# Patient Record
Sex: FEMALE | Race: WHITE | Employment: OTHER | URBAN - METROPOLITAN AREA
[De-identification: names, ages, dates, MRNs, and addresses within clinical notes are randomized per-mention and may not be internally consistent; named-entity substitution may affect disease eponyms.]

---

## 2018-01-01 ENCOUNTER — HOSPITAL ENCOUNTER (OUTPATIENT)
Dept: OTHER | Age: 68
Discharge: HOME | End: 2018-08-08
Attending: INTERNAL MEDICINE | Admitting: INTERNAL MEDICINE

## 2018-01-01 ENCOUNTER — HOSPITAL ENCOUNTER (OUTPATIENT)
Dept: GENERAL RADIOLOGY | Age: 68
Discharge: HOME | End: 2018-08-23
Attending: INTERNAL MEDICINE | Admitting: INTERNAL MEDICINE

## 2018-01-01 ENCOUNTER — HOSPITAL ENCOUNTER (OUTPATIENT)
Age: 68
Setting detail: SPECIMEN
Discharge: HOME | End: 2018-10-22
Payer: COMMERCIAL

## 2018-01-01 ENCOUNTER — APPOINTMENT (OUTPATIENT)
Dept: GENERAL RADIOLOGY | Age: 68
End: 2018-01-01
Payer: COMMERCIAL

## 2018-01-01 ENCOUNTER — OFFICE VISIT (OUTPATIENT)
Dept: BARIATRICS/WEIGHT MGMT | Age: 68
End: 2018-01-01
Payer: COMMERCIAL

## 2018-01-01 ENCOUNTER — HOSPITAL ENCOUNTER (EMERGENCY)
Age: 68
Discharge: HOME | End: 2018-10-02
Payer: COMMERCIAL

## 2018-01-01 ENCOUNTER — APPOINTMENT (OUTPATIENT)
Dept: CT IMAGING | Age: 68
End: 2018-01-01
Payer: COMMERCIAL

## 2018-01-01 ENCOUNTER — HOSPITAL ENCOUNTER (OUTPATIENT)
Age: 68
Setting detail: SPECIMEN
Discharge: HOME | End: 2018-12-03
Payer: COMMERCIAL

## 2018-01-01 ENCOUNTER — HOSPITAL ENCOUNTER (OUTPATIENT)
Age: 68
Setting detail: SPECIMEN
Discharge: HOME | End: 2018-11-12
Payer: COMMERCIAL

## 2018-01-01 VITALS
HEART RATE: 88 BPM | WEIGHT: 202 LBS | DIASTOLIC BLOOD PRESSURE: 90 MMHG | RESPIRATION RATE: 18 BRPM | HEIGHT: 67 IN | BODY MASS INDEX: 31.71 KG/M2 | SYSTOLIC BLOOD PRESSURE: 114 MMHG

## 2018-01-01 VITALS
SYSTOLIC BLOOD PRESSURE: 131 MMHG | DIASTOLIC BLOOD PRESSURE: 75 MMHG | OXYGEN SATURATION: 94 % | WEIGHT: 218 LBS | HEART RATE: 70 BPM | RESPIRATION RATE: 16 BRPM | TEMPERATURE: 97.9 F | HEIGHT: 66 IN | BODY MASS INDEX: 35.03 KG/M2

## 2018-01-01 DIAGNOSIS — M89.9 LYTIC LESION OF BONE ON X-RAY: ICD-10-CM

## 2018-01-01 DIAGNOSIS — C41.1: ICD-10-CM

## 2018-01-01 DIAGNOSIS — K76.9 HEPATIC LESION: Primary | ICD-10-CM

## 2018-01-01 DIAGNOSIS — S05.12XA ECCHYMOSIS OF LEFT EYE: ICD-10-CM
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
## 2018-01-01 DIAGNOSIS — S42.202A CLOSED FRACTURE OF PROXIMAL END OF LEFT HUMERUS, UNSPECIFIED FRACTURE MORPHOLOGY, INITIAL ENCOUNTER: Primary | ICD-10-CM

## 2018-01-01 LAB
ALBUMIN SERPL-MCNC: 3.6 GM/DL (ref 3.4–5)
ALBUMIN SERPL-MCNC: 3.6 GM/DL (ref 3.4–5)
ALBUMIN SERPL-MCNC: 3.8 GM/DL (ref 3.4–5)
ALP BLD-CCNC: 248 IU/L (ref 40–129)
ALP BLD-CCNC: 259 IU/L (ref 40–128)
ALP BLD-CCNC: 284 IU/L (ref 40–129)
ALT SERPL-CCNC: 18 U/L (ref 10–40)
ALT SERPL-CCNC: 21 U/L (ref 10–40)
ALT SERPL-CCNC: 36 U/L (ref 10–40)
ANION GAP SERPL CALCULATED.3IONS-SCNC: 12 MMOL/L (ref 4–16)
AST SERPL-CCNC: 40 IU/L (ref 15–37)
AST SERPL-CCNC: 42 IU/L (ref 15–37)
AST SERPL-CCNC: 74 IU/L (ref 15–37)
BILIRUB SERPL-MCNC: 0.4 MG/DL (ref 0–1)
BILIRUB SERPL-MCNC: 0.5 MG/DL (ref 0–1)
BILIRUB SERPL-MCNC: 0.7 MG/DL (ref 0–1)
BUN BLDV-MCNC: 7 MG/DL (ref 6–23)
BUN BLDV-MCNC: 8 MG/DL (ref 6–23)
BUN BLDV-MCNC: 9 MG/DL (ref 6–23)
CA 125: 18
CA 19-9: 27
CA 27.29: 8.4 U/ML
CALCIUM SERPL-MCNC: 9.2 MG/DL (ref 8.3–10.6)
CALCIUM SERPL-MCNC: 9.2 MG/DL (ref 8.3–10.6)
CALCIUM SERPL-MCNC: 9.6 MG/DL (ref 8.3–10.6)
CEA: 2.1 NG/ML
CHLORIDE BLD-SCNC: 100 MMOL/L (ref 99–110)
CHLORIDE BLD-SCNC: 101 MMOL/L (ref 99–110)
CHLORIDE BLD-SCNC: 101 MMOL/L (ref 99–110)
CO2: 29 MMOL/L (ref 21–32)
CO2: 30 MMOL/L (ref 21–32)
CO2: 30 MMOL/L (ref 21–32)
CREAT SERPL-MCNC: 0.6 MG/DL (ref 0.6–1.1)
CREAT SERPL-MCNC: 0.7 MG/DL (ref 0.6–1.1)
CREAT SERPL-MCNC: 0.7 MG/DL (ref 0.6–1.1)
GFR AFRICAN AMERICAN: >60 ML/MIN/1.73M2
GFR NON-AFRICAN AMERICAN: >60 ML/MIN/1.73M2
GLUCOSE BLD-MCNC: 101 MG/DL (ref 70–99)
GLUCOSE BLD-MCNC: 128 MG/DL (ref 70–99)
GLUCOSE BLD-MCNC: 143 MG/DL (ref 70–99)
LACTATE DEHYDROGENASE: 217 IU/L (ref 120–246)
MS ALPHA-FETOPROTEIN: 5
POTASSIUM SERPL-SCNC: 4.3 MMOL/L (ref 3.5–5.1)
POTASSIUM SERPL-SCNC: 4.5 MMOL/L (ref 3.5–5.1)
POTASSIUM SERPL-SCNC: 4.8 MMOL/L (ref 3.5–5.1)
SODIUM BLD-SCNC: 141 MMOL/L (ref 135–145)
SODIUM BLD-SCNC: 143 MMOL/L (ref 135–145)
SODIUM BLD-SCNC: 143 MMOL/L (ref 135–145)
TOTAL PROTEIN: 6.8 GM/DL (ref 6.4–8.2)
TOTAL PROTEIN: 7 GM/DL (ref 6.4–8.2)
TOTAL PROTEIN: 7.5 GM/DL (ref 6.4–8.2)

## 2018-01-01 PROCEDURE — 73070 X-RAY EXAM OF ELBOW: CPT

## 2018-01-01 PROCEDURE — 73030 X-RAY EXAM OF SHOULDER: CPT

## 2018-01-01 PROCEDURE — 99284 EMERGENCY DEPT VISIT MOD MDM: CPT

## 2018-01-01 PROCEDURE — 80053 COMPREHEN METABOLIC PANEL: CPT

## 2018-01-01 PROCEDURE — 70486 CT MAXILLOFACIAL W/O DYE: CPT

## 2018-01-01 PROCEDURE — 70450 CT HEAD/BRAIN W/O DYE: CPT

## 2018-01-01 PROCEDURE — 6370000000 HC RX 637 (ALT 250 FOR IP): Performed by: NURSE PRACTITIONER

## 2018-01-01 PROCEDURE — 99204 OFFICE O/P NEW MOD 45 MIN: CPT | Performed by: SURGERY

## 2018-01-01 RX ORDER — HYDROCODONE BITARTRATE AND ACETAMINOPHEN 5; 325 MG/1; MG/1
1 TABLET ORAL ONCE
Status: COMPLETED | OUTPATIENT
Start: 2018-01-01 | End: 2018-01-01

## 2018-01-01 RX ORDER — DIAZEPAM 5 MG/1
1 TABLET ORAL 2 TIMES DAILY PRN
Refills: 5 | Status: ON HOLD | COMMUNITY
Start: 2018-01-01 | End: 2019-01-01 | Stop reason: SDUPTHER

## 2018-01-01 RX ORDER — HYDROCODONE BITARTRATE AND ACETAMINOPHEN 5; 325 MG/1; MG/1
1 TABLET ORAL EVERY 6 HOURS PRN
Qty: 10 TABLET | Refills: 0 | Status: SHIPPED | OUTPATIENT
Start: 2018-01-01 | End: 2018-01-01

## 2018-01-01 RX ORDER — ATENOLOL 100 MG/1
100 TABLET ORAL
Status: ON HOLD | COMMUNITY
End: 2019-01-01 | Stop reason: HOSPADM

## 2018-01-01 RX ORDER — CHLORAL HYDRATE 500 MG
3000 CAPSULE ORAL 3 TIMES DAILY
COMMUNITY

## 2018-01-01 RX ADMIN — HYDROCODONE BITARTRATE AND ACETAMINOPHEN 1 TABLET: 5; 325 TABLET ORAL at 20:13

## 2018-01-01 ASSESSMENT — PAIN SCALES - GENERAL
PAINLEVEL_OUTOF10: 8
PAINLEVEL_OUTOF10: 8

## 2018-01-01 ASSESSMENT — PAIN DESCRIPTION - ORIENTATION: ORIENTATION: LEFT

## 2018-01-01 ASSESSMENT — ENCOUNTER SYMPTOMS
ALLERGIC/IMMUNOLOGIC NEGATIVE: 1
GASTROINTESTINAL NEGATIVE: 1
EYES NEGATIVE: 1
RESPIRATORY NEGATIVE: 1

## 2018-01-01 ASSESSMENT — PAIN DESCRIPTION - PAIN TYPE: TYPE: ACUTE PAIN

## 2018-01-01 ASSESSMENT — PAIN DESCRIPTION - LOCATION: LOCATION: ARM

## 2018-10-03 NOTE — ED NOTES
Discharge instructions reviewed with patient. PT verbalizes understanding. All questions answered. Follow up instructions given. PT denies any further needs at this time.       Brody Ruiz [place]  03/87/60 1776

## 2018-10-03 NOTE — ED PROVIDER NOTES
pm    COMPARISON:  None    HISTORY:  ORDERING SYSTEM PROVIDED HISTORY: fall  TECHNOLOGIST PROVIDED HISTORY:  Reason for exam:->fall  Ordering Physician Provided Reason for Exam: fall  Acuity: Acute  Type of Exam: Initial    FINDINGS:  Left shoulder: There is a comminuted proximal left humeral head and neck  fracture with involvement of the surgical and anatomic necks as well as  greater tuberosity.  The major humeral head fragment remains located in  relation to the bony glenoid. There is mild AC and glenohumeral degenerative change.  No additional  fracture is appreciated. Left elbow: There is no evidence of acute fracture or dislocation.  There is  no joint effusion.  There is a small triceps insertional enthesophyte.  The  bones are mildly demineralized.                Preliminary result by Billie Jose MD (10/02/18 19:31:32)                Impression:    1. Comminuted acute left proximal humeral head and neck fracture. 2. No acute osseous abnormality of the left elbow.                    XR SHOULDER LEFT (MIN 2 VIEWS) (Preliminary result)   Result time 10/02/18 19:33:40   Preliminary result by Billie Jose MD (10/02/18 19:33:40)                Impression:    1. Comminuted acute left proximal humeral head and neck fracture. 2. No acute osseous abnormality of the left elbow. Narrative:    EXAMINATION:  3 XRAY VIEWS OF THE LEFT SHOULDER; 2 XRAY VIEWS OF THE LEFT ELBOW    10/2/2018 6:44 pm    COMPARISON:  None    HISTORY:  ORDERING SYSTEM PROVIDED HISTORY: fall  TECHNOLOGIST PROVIDED HISTORY:  Reason for exam:->fall  Ordering Physician Provided Reason for Exam: fall  Acuity: Acute  Type of Exam: Initial    FINDINGS:  Left shoulder: There is a comminuted proximal left humeral head and neck  fracture with involvement of the surgical and anatomic necks as well as  greater tuberosity.  The major humeral head fragment remains located in  relation to the bony glenoid.     There is mild AC and called? ->No  Ordering Physician Provided Reason for Exam: fall at 3am  Acuity: Acute  Type of Exam: Initial  Mechanism of Injury: fall at 3am  Relevant Medical/Surgical History: none    FINDINGS:  CT HEAD:    BRAIN/VENTRICLES: There is no acute intracranial hemorrhage, mass effect or  midline shift.  No abnormal extra-axial fluid collection.  The gray-white  differentiation is maintained without evidence of an acute infarct.  There is  no evidence of hydrocephalus. SOFT TISSUES/SKULL: There is soft tissue swelling and induration overlying  the left supraorbital frontal bone.  Otherwise, no acute abnormality of the  visualized skull or soft tissues. CT FACIAL BONES:    FACIAL BONES:  The maxilla, pterygoid plates and zygomatic arches are intact. The mandible is intact.  The mandibular condyles are normally situated.  The  nasal bones and maxillary nasal processes are intact. Lucyann Shark is an expansile  lytic lesion involving the body of the left mandible measuring 3.2 cm in  maximum transverse dimension and 2.4 cm in maximal craniocaudal dimension. ORBITS: There is left preorbital soft tissue swelling.  No post orbital  cellulitic change.  The globes appear intact.  The extraocular muscles, optic  nerve sheath complexes and lacrimal glands appear unremarkable.  No  retrobulbar hematoma or mass is seen.  The orbital walls and rims are intact. SINUSES/MASTOIDS:  The paranasal sinuses and mastoid air cells are well  aerated.  No acute fracture is seen. SOFT TISSUES:  No appreciable facial soft tissue swelling is seen.                    CT HEAD WO CONTRAST (Final result)   Result time 10/02/18 19:16:21   Final result by Laina Bell MD (10/02/18 19:16:21)                Impression:    1. No CT evidence for acute osseous abnormality.   2. Left preseptal cellulitic change and soft tissue swelling overlying the  left supraorbital frontal bone, but no post orbital cellulitic change or  underlying calvarial expansile  lytic lesion involving the body of the left mandible measuring 3.2 cm in  maximum transverse dimension and 2.4 cm in maximal craniocaudal dimension. ORBITS: There is left preorbital soft tissue swelling.  No post orbital  cellulitic change.  The globes appear intact.  The extraocular muscles, optic  nerve sheath complexes and lacrimal glands appear unremarkable.  No  retrobulbar hematoma or mass is seen.  The orbital walls and rims are intact. SINUSES/MASTOIDS:  The paranasal sinuses and mastoid air cells are well  aerated.  No acute fracture is seen. SOFT TISSUES:  No appreciable facial soft tissue swelling is seen. ED COURSE & MEDICAL DECISION MAKING       Vital signs and nursing notes reviewed during ED course. I have independently evaluated this patient . Supervising physican present in the Emergency Department, available for consultation, throughout entirety of  patient care. All pertinent Lab data and radiographic results reviewed with patient at bedside. The patient and/or the family were informed of the treatment plan, and time was allotted to answer questions. Disposition and plan discussed at bedside with patient and/or the family today. Patient does have a proximal humerus fracture that involves the head and neck. Patient instructed to call orthopedics tomorrow for follow-up appointment. Patient was placed in a sling and swath and provided pain medication. Patient's other imaging was negative for acute findings. When I went to palpation about the lytic lesion that was found patient was very aware and reports that is currently being worked up by her primary care provider. Signs and symptoms that would necessitate return to the emergency department were discussed the patient and/or family and patient and/or family agrees to return to the emergency department if the symptoms worsen or new symptoms develop.       Differential Diagnosis: Cardiac Arrhythmia, Stroke,

## 2019-01-01 ENCOUNTER — HOSPITAL ENCOUNTER (OUTPATIENT)
Age: 69
Setting detail: SPECIMEN
Discharge: HOME | End: 2019-04-08
Payer: MEDICARE

## 2019-01-01 ENCOUNTER — HOSPITAL ENCOUNTER (OUTPATIENT)
Age: 69
Setting detail: SPECIMEN
Discharge: HOME | End: 2019-03-18
Payer: MEDICARE

## 2019-01-01 ENCOUNTER — APPOINTMENT (OUTPATIENT)
Dept: CT IMAGING | Age: 69
DRG: 682 | End: 2019-01-01
Payer: COMMERCIAL

## 2019-01-01 ENCOUNTER — APPOINTMENT (OUTPATIENT)
Dept: MRI IMAGING | Age: 69
DRG: 682 | End: 2019-01-01
Payer: COMMERCIAL

## 2019-01-01 ENCOUNTER — APPOINTMENT (OUTPATIENT)
Dept: GENERAL RADIOLOGY | Age: 69
DRG: 871 | End: 2019-01-01
Payer: COMMERCIAL

## 2019-01-01 ENCOUNTER — HOSPITAL ENCOUNTER (INPATIENT)
Age: 69
LOS: 2 days | Discharge: HOSPICE | DRG: 871 | End: 2019-04-23
Attending: EMERGENCY MEDICINE | Admitting: HOSPITALIST
Payer: COMMERCIAL

## 2019-01-01 ENCOUNTER — APPOINTMENT (OUTPATIENT)
Dept: GENERAL RADIOLOGY | Age: 69
DRG: 682 | End: 2019-01-01
Payer: COMMERCIAL

## 2019-01-01 ENCOUNTER — HOSPITAL ENCOUNTER (OUTPATIENT)
Age: 69
Setting detail: SPECIMEN
Discharge: HOME | End: 2019-04-20
Payer: MEDICARE

## 2019-01-01 ENCOUNTER — HOSPITAL ENCOUNTER (OUTPATIENT)
Dept: CT IMAGING | Age: 69
Discharge: HOME | End: 2019-01-09
Payer: COMMERCIAL

## 2019-01-01 ENCOUNTER — HOSPITAL ENCOUNTER (INPATIENT)
Age: 69
LOS: 9 days | Discharge: NURSING, REHAB OR LONG-TERM CARE | DRG: 682 | End: 2019-03-10
Attending: EMERGENCY MEDICINE | Admitting: HOSPITALIST
Payer: COMMERCIAL

## 2019-01-01 ENCOUNTER — HOSPITAL ENCOUNTER (OUTPATIENT)
Age: 69
Setting detail: SPECIMEN
Discharge: HOME | End: 2019-04-01
Payer: MEDICARE

## 2019-01-01 ENCOUNTER — HOSPITAL ENCOUNTER (OUTPATIENT)
Age: 69
Setting detail: SPECIMEN
Discharge: HOME | End: 2019-04-15
Payer: COMMERCIAL

## 2019-01-01 ENCOUNTER — APPOINTMENT (OUTPATIENT)
Dept: CT IMAGING | Age: 69
DRG: 871 | End: 2019-01-01
Payer: COMMERCIAL

## 2019-01-01 ENCOUNTER — HOSPITAL ENCOUNTER (OUTPATIENT)
Age: 69
Setting detail: SPECIMEN
Discharge: HOME | End: 2019-04-21
Payer: MEDICARE

## 2019-01-01 ENCOUNTER — HOSPITAL ENCOUNTER (OUTPATIENT)
Age: 69
Setting detail: SPECIMEN
Discharge: HOME | End: 2019-03-25
Payer: MEDICARE

## 2019-01-01 ENCOUNTER — HOSPITAL ENCOUNTER (INPATIENT)
Age: 69
LOS: 1 days | DRG: 441 | End: 2019-04-24
Attending: FAMILY MEDICINE | Admitting: FAMILY MEDICINE
Payer: COMMERCIAL

## 2019-01-01 VITALS
BODY MASS INDEX: 27.83 KG/M2 | OXYGEN SATURATION: 97 % | HEIGHT: 66 IN | WEIGHT: 173.2 LBS | DIASTOLIC BLOOD PRESSURE: 77 MMHG | HEART RATE: 91 BPM | TEMPERATURE: 98.3 F | RESPIRATION RATE: 17 BRPM | SYSTOLIC BLOOD PRESSURE: 130 MMHG

## 2019-01-01 VITALS
HEIGHT: 63 IN | HEART RATE: 105 BPM | WEIGHT: 175.5 LBS | BODY MASS INDEX: 31.1 KG/M2 | OXYGEN SATURATION: 96 % | RESPIRATION RATE: 13 BRPM | DIASTOLIC BLOOD PRESSURE: 54 MMHG | SYSTOLIC BLOOD PRESSURE: 87 MMHG | TEMPERATURE: 97.5 F

## 2019-01-01 VITALS
RESPIRATION RATE: 16 BRPM | HEART RATE: 103 BPM | SYSTOLIC BLOOD PRESSURE: 70 MMHG | TEMPERATURE: 97.5 F | DIASTOLIC BLOOD PRESSURE: 50 MMHG

## 2019-01-01 DIAGNOSIS — N17.9 ACUTE KIDNEY INJURY (HCC): Primary | ICD-10-CM

## 2019-01-01 DIAGNOSIS — E87.0 HYPERNATREMIA: ICD-10-CM

## 2019-01-01 DIAGNOSIS — S30.1XXA CONTUSION OF ABDOMINAL WALL, INITIAL ENCOUNTER: ICD-10-CM

## 2019-01-01 DIAGNOSIS — R79.89 INCREASED AMMONIA LEVEL: ICD-10-CM

## 2019-01-01 DIAGNOSIS — R53.83 FATIGUE, UNSPECIFIED TYPE: Primary | ICD-10-CM

## 2019-01-01 DIAGNOSIS — R41.82 ALTERED MENTAL STATUS, UNSPECIFIED ALTERED MENTAL STATUS TYPE: ICD-10-CM

## 2019-01-01 DIAGNOSIS — Z85.9 HISTORY OF CANCER: ICD-10-CM

## 2019-01-01 DIAGNOSIS — R53.1 GENERAL WEAKNESS: ICD-10-CM

## 2019-01-01 DIAGNOSIS — R14.0 DISTENDED ABDOMEN: ICD-10-CM

## 2019-01-01 DIAGNOSIS — R60.9 PERIPHERAL EDEMA: ICD-10-CM

## 2019-01-01 DIAGNOSIS — R79.89 ELEVATED BRAIN NATRIURETIC PEPTIDE (BNP) LEVEL: ICD-10-CM

## 2019-01-01 DIAGNOSIS — C79.9 METASTATIC CANCER (HCC): ICD-10-CM

## 2019-01-01 DIAGNOSIS — C7A.1 ADENOCARCINOMA WITH NEUROENDOCRINE DIFFERENTIATION (HCC): ICD-10-CM

## 2019-01-01 DIAGNOSIS — R74.8 ELEVATED CK: ICD-10-CM

## 2019-01-01 DIAGNOSIS — R79.89 ELEVATED LACTIC ACID LEVEL: ICD-10-CM

## 2019-01-01 LAB
ACANTHOCYTES: ABNORMAL
ADENOVIRUS DETECTION BY PCR: NOT DETECTED
ALBUMIN SERPL-MCNC: 1.5 GM/DL (ref 3.4–5)
ALBUMIN SERPL-MCNC: 1.6 GM/DL (ref 3.4–5)
ALBUMIN SERPL-MCNC: 1.7 GM/DL (ref 3.4–5)
ALBUMIN SERPL-MCNC: 1.8 GM/DL (ref 3.4–5)
ALBUMIN SERPL-MCNC: 1.9 GM/DL (ref 3.4–5)
ALBUMIN SERPL-MCNC: 1.9 GM/DL (ref 3.4–5)
ALBUMIN SERPL-MCNC: 2 GM/DL (ref 3.4–5)
ALBUMIN SERPL-MCNC: 2.4 GM/DL (ref 3.4–5)
ALP BLD-CCNC: 230 IU/L (ref 40–129)
ALP BLD-CCNC: 246 IU/L (ref 40–129)
ALP BLD-CCNC: 273 IU/L (ref 40–128)
ALP BLD-CCNC: 275 IU/L (ref 40–129)
ALP BLD-CCNC: 278 IU/L (ref 40–128)
ALP BLD-CCNC: 304 IU/L (ref 40–128)
ALP BLD-CCNC: 323 IU/L (ref 40–128)
ALT SERPL-CCNC: 19 U/L (ref 10–40)
ALT SERPL-CCNC: 20 U/L (ref 10–40)
ALT SERPL-CCNC: 20 U/L (ref 10–40)
ALT SERPL-CCNC: 22 U/L (ref 10–40)
ALT SERPL-CCNC: 33 U/L (ref 10–40)
ALT SERPL-CCNC: 36 U/L (ref 10–40)
ALT SERPL-CCNC: 53 U/L (ref 10–40)
AMMONIA: 123 UMOL/L (ref 11–51)
AMMONIA: 136 UMOL/L (ref 11–51)
AMMONIA: 138 UMOL/L (ref 11–51)
AMMONIA: 155 UMOL/L (ref 11–51)
AMMONIA: 18 UMOL/L (ref 11–51)
AMMONIA: 27 UMOL/L (ref 11–51)
ANION GAP SERPL CALCULATED.3IONS-SCNC: 10 MMOL/L (ref 4–16)
ANION GAP SERPL CALCULATED.3IONS-SCNC: 13 MMOL/L (ref 4–16)
ANION GAP SERPL CALCULATED.3IONS-SCNC: 13 MMOL/L (ref 4–16)
ANION GAP SERPL CALCULATED.3IONS-SCNC: 14 MMOL/L (ref 4–16)
ANION GAP SERPL CALCULATED.3IONS-SCNC: 17 MMOL/L (ref 4–16)
ANION GAP SERPL CALCULATED.3IONS-SCNC: 5 MMOL/L (ref 4–16)
ANION GAP SERPL CALCULATED.3IONS-SCNC: 6 MMOL/L (ref 4–16)
ANION GAP SERPL CALCULATED.3IONS-SCNC: 7 MMOL/L (ref 4–16)
ANION GAP SERPL CALCULATED.3IONS-SCNC: 7 MMOL/L (ref 4–16)
ANION GAP SERPL CALCULATED.3IONS-SCNC: 8 MMOL/L (ref 4–16)
ANION GAP SERPL CALCULATED.3IONS-SCNC: 9 MMOL/L (ref 4–16)
ANISOCYTOSIS: ABNORMAL
APTT: 30 SECONDS (ref 21.2–33)
AST SERPL-CCNC: 28 IU/L (ref 15–37)
AST SERPL-CCNC: 29 IU/L (ref 15–37)
AST SERPL-CCNC: 31 IU/L (ref 15–37)
AST SERPL-CCNC: 31 IU/L (ref 15–37)
AST SERPL-CCNC: 61 IU/L (ref 15–37)
AST SERPL-CCNC: 80 IU/L (ref 15–37)
AST SERPL-CCNC: 80 IU/L (ref 15–37)
BACTERIA: ABNORMAL /HPF
BANDED NEUTROPHILS ABSOLUTE COUNT: 0.54 K/CU MM
BANDED NEUTROPHILS RELATIVE PERCENT: 3 % (ref 5–11)
BASE EXCESS: ABNORMAL (ref 0–2.4)
BASOPHILS ABSOLUTE: 0 K/CU MM
BASOPHILS RELATIVE PERCENT: 0 % (ref 0–1)
BASOPHILS RELATIVE PERCENT: 0.1 % (ref 0–1)
BASOPHILS RELATIVE PERCENT: 0.2 % (ref 0–1)
BASOPHILS RELATIVE PERCENT: 0.2 % (ref 0–1)
BASOPHILS RELATIVE PERCENT: 0.3 % (ref 0–1)
BILIRUB SERPL-MCNC: 1 MG/DL (ref 0–1)
BILIRUB SERPL-MCNC: 1.1 MG/DL (ref 0–1)
BILIRUB SERPL-MCNC: 1.3 MG/DL (ref 0–1)
BILIRUB SERPL-MCNC: 1.3 MG/DL (ref 0–1)
BILIRUB SERPL-MCNC: 1.4 MG/DL (ref 0–1)
BILIRUB SERPL-MCNC: 1.6 MG/DL (ref 0–1)
BILIRUB SERPL-MCNC: 2.2 MG/DL (ref 0–1)
BILIRUBIN URINE: ABNORMAL MG/DL
BILIRUBIN URINE: ABNORMAL MG/DL
BILIRUBIN URINE: NEGATIVE MG/DL
BLOOD, URINE: ABNORMAL
BLOOD, URINE: NEGATIVE
BLOOD, URINE: NEGATIVE
BORDETELLA PERTUSSIS PCR: NOT DETECTED
BUN BLDV-MCNC: 11 MG/DL (ref 6–23)
BUN BLDV-MCNC: 12 MG/DL (ref 6–23)
BUN BLDV-MCNC: 13 MG/DL (ref 6–23)
BUN BLDV-MCNC: 16 MG/DL (ref 6–23)
BUN BLDV-MCNC: 16 MG/DL (ref 6–23)
BUN BLDV-MCNC: 17 MG/DL (ref 6–23)
BUN BLDV-MCNC: 22 MG/DL (ref 6–23)
BUN BLDV-MCNC: 25 MG/DL (ref 6–23)
BUN BLDV-MCNC: 26 MG/DL (ref 6–23)
BUN BLDV-MCNC: 28 MG/DL (ref 6–23)
BUN BLDV-MCNC: 33 MG/DL (ref 6–23)
BUN BLDV-MCNC: 37 MG/DL (ref 6–23)
BUN BLDV-MCNC: 46 MG/DL (ref 6–23)
BUN BLDV-MCNC: 49 MG/DL (ref 6–23)
BUN BLDV-MCNC: 52 MG/DL (ref 6–23)
BUN BLDV-MCNC: 53 MG/DL (ref 6–23)
BUN BLDV-MCNC: 8 MG/DL (ref 6–23)
BUN BLDV-MCNC: 8 MG/DL (ref 6–23)
BURR CELLS: ABNORMAL
CALCIUM SERPL-MCNC: 7 MG/DL (ref 8.3–10.6)
CALCIUM SERPL-MCNC: 7.1 MG/DL (ref 8.3–10.6)
CALCIUM SERPL-MCNC: 7.1 MG/DL (ref 8.3–10.6)
CALCIUM SERPL-MCNC: 7.2 MG/DL (ref 8.3–10.6)
CALCIUM SERPL-MCNC: 7.2 MG/DL (ref 8.3–10.6)
CALCIUM SERPL-MCNC: 7.3 MG/DL (ref 8.3–10.6)
CALCIUM SERPL-MCNC: 7.4 MG/DL (ref 8.3–10.6)
CALCIUM SERPL-MCNC: 7.5 MG/DL (ref 8.3–10.6)
CALCIUM SERPL-MCNC: 7.6 MG/DL (ref 8.3–10.6)
CALCIUM SERPL-MCNC: 7.8 MG/DL (ref 8.3–10.6)
CHLAMYDOPHILA PNEUMONIA PCR: NOT DETECTED
CHLORIDE BLD-SCNC: 102 MMOL/L (ref 99–110)
CHLORIDE BLD-SCNC: 102 MMOL/L (ref 99–110)
CHLORIDE BLD-SCNC: 106 MMOL/L (ref 99–110)
CHLORIDE BLD-SCNC: 107 MMOL/L (ref 99–110)
CHLORIDE BLD-SCNC: 108 MMOL/L (ref 99–110)
CHLORIDE BLD-SCNC: 109 MMOL/L (ref 99–110)
CHLORIDE BLD-SCNC: 109 MMOL/L (ref 99–110)
CHLORIDE BLD-SCNC: 110 MMOL/L (ref 99–110)
CHLORIDE BLD-SCNC: 112 MMOL/L (ref 99–110)
CHLORIDE BLD-SCNC: 113 MMOL/L (ref 99–110)
CHLORIDE BLD-SCNC: 114 MMOL/L (ref 99–110)
CHLORIDE BLD-SCNC: 114 MMOL/L (ref 99–110)
CHLORIDE BLD-SCNC: 115 MMOL/L (ref 99–110)
CHLORIDE BLD-SCNC: 116 MMOL/L (ref 99–110)
CHLORIDE BLD-SCNC: 120 MMOL/L (ref 99–110)
CLARITY: CLEAR
CO2: 17 MMOL/L (ref 21–32)
CO2: 20 MMOL/L (ref 21–32)
CO2: 20 MMOL/L (ref 21–32)
CO2: 21 MMOL/L (ref 21–32)
CO2: 22 MMOL/L (ref 21–32)
CO2: 22 MMOL/L (ref 21–32)
CO2: 23 MMOL/L (ref 21–32)
CO2: 24 MMOL/L (ref 21–32)
CO2: 24 MMOL/L (ref 21–32)
CO2: 25 MMOL/L (ref 21–32)
CO2: 25 MMOL/L (ref 21–32)
CO2: 26 MMOL/L (ref 21–32)
CO2: 27 MMOL/L (ref 21–32)
CO2: 28 MMOL/L (ref 21–32)
COLOR: ABNORMAL
COMMENT: ABNORMAL
CORONAVIRUS 229E PCR: NOT DETECTED
CORONAVIRUS HKU1 PCR: NOT DETECTED
CORONAVIRUS NL63 PCR: NOT DETECTED
CORONAVIRUS OC43 PCR: NOT DETECTED
CREAT SERPL-MCNC: 0.7 MG/DL (ref 0.6–1.1)
CREAT SERPL-MCNC: 0.8 MG/DL (ref 0.6–1.1)
CREAT SERPL-MCNC: 0.8 MG/DL (ref 0.6–1.1)
CREAT SERPL-MCNC: 0.9 MG/DL (ref 0.6–1.1)
CREAT SERPL-MCNC: 1 MG/DL (ref 0.6–1.1)
CREAT SERPL-MCNC: 1.1 MG/DL (ref 0.6–1.1)
CREAT SERPL-MCNC: 1.2 MG/DL (ref 0.6–1.1)
CREAT SERPL-MCNC: 1.5 MG/DL (ref 0.6–1.1)
CREAT SERPL-MCNC: 2 MG/DL (ref 0.6–1.1)
CREAT SERPL-MCNC: 2.7 MG/DL (ref 0.6–1.1)
CREAT SERPL-MCNC: 3.5 MG/DL (ref 0.6–1.1)
CREAT SERPL-MCNC: 3.8 MG/DL (ref 0.6–1.1)
CULTURE: ABNORMAL
CULTURE: NORMAL
DIFFERENTIAL TYPE: ABNORMAL
EKG ATRIAL RATE: 109 BPM
EKG ATRIAL RATE: 113 BPM
EKG ATRIAL RATE: 71 BPM
EKG DIAGNOSIS: NORMAL
EKG P AXIS: -13 DEGREES
EKG P AXIS: 63 DEGREES
EKG P AXIS: 77 DEGREES
EKG P-R INTERVAL: 142 MS
EKG P-R INTERVAL: 148 MS
EKG P-R INTERVAL: 154 MS
EKG Q-T INTERVAL: 334 MS
EKG Q-T INTERVAL: 336 MS
EKG Q-T INTERVAL: 440 MS
EKG QRS DURATION: 66 MS
EKG QRS DURATION: 68 MS
EKG QRS DURATION: 78 MS
EKG QTC CALCULATION (BAZETT): 449 MS
EKG QTC CALCULATION (BAZETT): 460 MS
EKG QTC CALCULATION (BAZETT): 478 MS
EKG R AXIS: -7 DEGREES
EKG R AXIS: 17 DEGREES
EKG R AXIS: 45 DEGREES
EKG T AXIS: 115 DEGREES
EKG T AXIS: 43 DEGREES
EKG T AXIS: 66 DEGREES
EKG VENTRICULAR RATE: 109 BPM
EKG VENTRICULAR RATE: 113 BPM
EKG VENTRICULAR RATE: 71 BPM
EOSINOPHILS ABSOLUTE: 0 K/CU MM
EOSINOPHILS ABSOLUTE: 0.1 K/CU MM
EOSINOPHILS RELATIVE PERCENT: 0 % (ref 0–3)
EOSINOPHILS RELATIVE PERCENT: 0.1 % (ref 0–3)
EOSINOPHILS RELATIVE PERCENT: 0.2 % (ref 0–3)
EOSINOPHILS RELATIVE PERCENT: 0.2 % (ref 0–3)
EOSINOPHILS RELATIVE PERCENT: 0.3 % (ref 0–3)
EOSINOPHILS RELATIVE PERCENT: 0.4 % (ref 0–3)
EOSINOPHILS RELATIVE PERCENT: 0.4 % (ref 0–3)
EOSINOPHILS RELATIVE PERCENT: 0.5 % (ref 0–3)
EOSINOPHILS RELATIVE PERCENT: 0.7 % (ref 0–3)
EOSINOPHILS RELATIVE PERCENT: 0.9 % (ref 0–3)
EOSINOPHILS RELATIVE PERCENT: 1 % (ref 0–3)
FERRITIN: 347 NG/ML (ref 15–150)
FOLATE: 13.8 NG/ML (ref 3.1–17.5)
GFR AFRICAN AMERICAN: 14 ML/MIN/1.73M2
GFR AFRICAN AMERICAN: 16 ML/MIN/1.73M2
GFR AFRICAN AMERICAN: 21 ML/MIN/1.73M2
GFR AFRICAN AMERICAN: 30 ML/MIN/1.73M2
GFR AFRICAN AMERICAN: 42 ML/MIN/1.73M2
GFR AFRICAN AMERICAN: 54 ML/MIN/1.73M2
GFR AFRICAN AMERICAN: 60 ML/MIN/1.73M2
GFR AFRICAN AMERICAN: >60 ML/MIN/1.73M2
GFR NON-AFRICAN AMERICAN: 12 ML/MIN/1.73M2
GFR NON-AFRICAN AMERICAN: 13 ML/MIN/1.73M2
GFR NON-AFRICAN AMERICAN: 17 ML/MIN/1.73M2
GFR NON-AFRICAN AMERICAN: 25 ML/MIN/1.73M2
GFR NON-AFRICAN AMERICAN: 34 ML/MIN/1.73M2
GFR NON-AFRICAN AMERICAN: 44 ML/MIN/1.73M2
GFR NON-AFRICAN AMERICAN: 49 ML/MIN/1.73M2
GFR NON-AFRICAN AMERICAN: 55 ML/MIN/1.73M2
GFR NON-AFRICAN AMERICAN: >60 ML/MIN/1.73M2
GLUCOSE BLD-MCNC: 103 MG/DL (ref 70–99)
GLUCOSE BLD-MCNC: 108 MG/DL (ref 70–99)
GLUCOSE BLD-MCNC: 110 MG/DL (ref 70–99)
GLUCOSE BLD-MCNC: 120 MG/DL (ref 70–99)
GLUCOSE BLD-MCNC: 132 MG/DL (ref 70–99)
GLUCOSE BLD-MCNC: 136 MG/DL
GLUCOSE BLD-MCNC: 136 MG/DL (ref 70–99)
GLUCOSE BLD-MCNC: 137 MG/DL (ref 70–99)
GLUCOSE BLD-MCNC: 140 MG/DL (ref 70–99)
GLUCOSE BLD-MCNC: 157 MG/DL (ref 70–99)
GLUCOSE BLD-MCNC: 69 MG/DL (ref 70–99)
GLUCOSE BLD-MCNC: 72 MG/DL (ref 70–99)
GLUCOSE BLD-MCNC: 73 MG/DL (ref 70–99)
GLUCOSE BLD-MCNC: 77 MG/DL (ref 70–99)
GLUCOSE BLD-MCNC: 78 MG/DL (ref 70–99)
GLUCOSE BLD-MCNC: 81 MG/DL (ref 70–99)
GLUCOSE BLD-MCNC: 82 MG/DL (ref 70–99)
GLUCOSE BLD-MCNC: 91 MG/DL (ref 70–99)
GLUCOSE, URINE: NEGATIVE MG/DL
HAPTOGLOBIN: 71 MG/DL (ref 30–200)
HAPTOGLOBIN: NORMAL MG/DL (ref 30–200)
HCO3 VENOUS: 22 MMOL/L (ref 19–25)
HCT VFR BLD CALC: 27.2 % (ref 37–47)
HCT VFR BLD CALC: 27.7 % (ref 37–47)
HCT VFR BLD CALC: 28.2 % (ref 37–47)
HCT VFR BLD CALC: 28.4 % (ref 37–47)
HCT VFR BLD CALC: 28.7 % (ref 37–47)
HCT VFR BLD CALC: 28.9 % (ref 37–47)
HCT VFR BLD CALC: 29.1 % (ref 37–47)
HCT VFR BLD CALC: 29.6 % (ref 37–47)
HCT VFR BLD CALC: 29.9 % (ref 37–47)
HCT VFR BLD CALC: 29.9 % (ref 37–47)
HCT VFR BLD CALC: 30.9 % (ref 37–47)
HCT VFR BLD CALC: 31 % (ref 37–47)
HCT VFR BLD CALC: 31.1 % (ref 37–47)
HCT VFR BLD CALC: 31.3 % (ref 37–47)
HCT VFR BLD CALC: 31.3 % (ref 37–47)
HCT VFR BLD CALC: 32.6 % (ref 37–47)
HCT VFR BLD CALC: 32.7 % (ref 37–47)
HCT VFR BLD CALC: 40.1 % (ref 37–47)
HEMOGLOBIN: 12.1 GM/DL (ref 12.5–16)
HEMOGLOBIN: 8.4 GM/DL (ref 12.5–16)
HEMOGLOBIN: 8.5 GM/DL (ref 12.5–16)
HEMOGLOBIN: 8.7 GM/DL (ref 12.5–16)
HEMOGLOBIN: 8.8 GM/DL (ref 12.5–16)
HEMOGLOBIN: 8.8 GM/DL (ref 12.5–16)
HEMOGLOBIN: 8.9 GM/DL (ref 12.5–16)
HEMOGLOBIN: 9 GM/DL (ref 12.5–16)
HEMOGLOBIN: 9 GM/DL (ref 12.5–16)
HEMOGLOBIN: 9.4 GM/DL (ref 12.5–16)
HEMOGLOBIN: 9.5 GM/DL (ref 12.5–16)
HEMOGLOBIN: 9.5 GM/DL (ref 12.5–16)
HEMOGLOBIN: 9.6 GM/DL (ref 12.5–16)
HEMOGLOBIN: 9.6 GM/DL (ref 12.5–16)
HEMOGLOBIN: 9.7 GM/DL (ref 12.5–16)
HEMOGLOBIN: 9.8 GM/DL (ref 12.5–16)
HUMAN METAPNEUMOVIRUS PCR: NOT DETECTED
HYALINE CASTS: 10 /LPF
HYALINE CASTS: 2 /LPF
HYALINE CASTS: 2 /LPF
ICTOTEST: POSITIVE
ICTOTEST: POSITIVE
IMMATURE NEUTROPHIL %: 0.2 % (ref 0–0.43)
IMMATURE NEUTROPHIL %: 0.2 % (ref 0–0.43)
IMMATURE NEUTROPHIL %: 0.3 % (ref 0–0.43)
IMMATURE NEUTROPHIL %: 0.3 % (ref 0–0.43)
IMMATURE NEUTROPHIL %: 0.4 % (ref 0–0.43)
IMMATURE NEUTROPHIL %: 0.4 % (ref 0–0.43)
IMMATURE NEUTROPHIL %: 0.5 % (ref 0–0.43)
IMMATURE NEUTROPHIL %: 0.5 % (ref 0–0.43)
IMMATURE NEUTROPHIL %: 0.6 % (ref 0–0.43)
IMMATURE NEUTROPHIL %: 0.7 % (ref 0–0.43)
IMMATURE NEUTROPHIL %: 0.7 % (ref 0–0.43)
IMMATURE NEUTROPHIL %: 0.8 % (ref 0–0.43)
IMMATURE NEUTROPHIL %: 1 % (ref 0–0.43)
INFLUENZA A BY PCR: NOT DETECTED
INFLUENZA A H1 (2009) PCR: NOT DETECTED
INFLUENZA A H1 PANDEMIC PCR: NOT DETECTED
INFLUENZA A H3 PCR: NOT DETECTED
INFLUENZA B BY PCR: NOT DETECTED
INR BLD: 1.54 INDEX
KETONES, URINE: NEGATIVE MG/DL
LACTATE DEHYDROGENASE: 303 IU/L (ref 120–246)
LACTATE: 1.1 MMOL/L (ref 0.4–2)
LACTATE: 2 MMOL/L (ref 0.4–2)
LACTATE: 2.1 MMOL/L (ref 0.4–2)
LACTATE: 2.3 MMOL/L (ref 0.4–2)
LACTATE: ABNORMAL MMOL/L (ref 0.4–2)
LEGIONELLA URINARY AG: NEGATIVE
LEUKOCYTE ESTERASE, URINE: NEGATIVE
LIPASE: 14 IU/L (ref 13–60)
LV EF: 53 %
LVEF MODALITY: NORMAL
LYMPHOCYTES ABSOLUTE: 0.7 K/CU MM
LYMPHOCYTES ABSOLUTE: 0.8 K/CU MM
LYMPHOCYTES ABSOLUTE: 0.9 K/CU MM
LYMPHOCYTES ABSOLUTE: 1.1 K/CU MM
LYMPHOCYTES ABSOLUTE: 1.1 K/CU MM
LYMPHOCYTES ABSOLUTE: 1.2 K/CU MM
LYMPHOCYTES ABSOLUTE: 1.3 K/CU MM
LYMPHOCYTES ABSOLUTE: 1.5 K/CU MM
LYMPHOCYTES ABSOLUTE: 1.6 K/CU MM
LYMPHOCYTES ABSOLUTE: 1.8 K/CU MM
LYMPHOCYTES ABSOLUTE: 1.9 K/CU MM
LYMPHOCYTES ABSOLUTE: 2.1 K/CU MM
LYMPHOCYTES ABSOLUTE: 2.4 K/CU MM
LYMPHOCYTES ABSOLUTE: ABNORMAL
LYMPHOCYTES RELATIVE PERCENT: 11 % (ref 24–44)
LYMPHOCYTES RELATIVE PERCENT: 11.4 % (ref 24–44)
LYMPHOCYTES RELATIVE PERCENT: 13.1 % (ref 24–44)
LYMPHOCYTES RELATIVE PERCENT: 13.4 % (ref 24–44)
LYMPHOCYTES RELATIVE PERCENT: 14.8 % (ref 24–44)
LYMPHOCYTES RELATIVE PERCENT: 15 % (ref 24–44)
LYMPHOCYTES RELATIVE PERCENT: 17.7 % (ref 24–44)
LYMPHOCYTES RELATIVE PERCENT: 18.2 % (ref 24–44)
LYMPHOCYTES RELATIVE PERCENT: 19.9 % (ref 24–44)
LYMPHOCYTES RELATIVE PERCENT: 22 % (ref 24–44)
LYMPHOCYTES RELATIVE PERCENT: 24.8 % (ref 24–44)
LYMPHOCYTES RELATIVE PERCENT: 34.5 % (ref 24–44)
LYMPHOCYTES RELATIVE PERCENT: 4 % (ref 24–44)
LYMPHOCYTES RELATIVE PERCENT: 44.2 % (ref 24–44)
LYMPHOCYTES RELATIVE PERCENT: 45.2 % (ref 24–44)
LYMPHOCYTES RELATIVE PERCENT: 51.5 % (ref 24–44)
LYMPHOCYTES RELATIVE PERCENT: 52.5 % (ref 24–44)
Lab: ABNORMAL
Lab: NORMAL
MACROCYTES: ABNORMAL
MAGNESIUM: 1.9 MG/DL (ref 1.8–2.4)
MAGNESIUM: 1.9 MG/DL (ref 1.8–2.4)
MAGNESIUM: 2 MG/DL (ref 1.8–2.4)
MAGNESIUM: 2 MG/DL (ref 1.8–2.4)
MAGNESIUM: 2.2 MG/DL (ref 1.8–2.4)
MCH RBC QN AUTO: 28.8 PG (ref 27–31)
MCH RBC QN AUTO: 28.8 PG (ref 27–31)
MCH RBC QN AUTO: 29 PG (ref 27–31)
MCH RBC QN AUTO: 29 PG (ref 27–31)
MCH RBC QN AUTO: 29.1 PG (ref 27–31)
MCH RBC QN AUTO: 29.1 PG (ref 27–31)
MCH RBC QN AUTO: 29.5 PG (ref 27–31)
MCH RBC QN AUTO: 29.5 PG (ref 27–31)
MCH RBC QN AUTO: 30 PG (ref 27–31)
MCH RBC QN AUTO: 30.1 PG (ref 27–31)
MCH RBC QN AUTO: 30.2 PG (ref 27–31)
MCH RBC QN AUTO: 30.4 PG (ref 27–31)
MCH RBC QN AUTO: 31.1 PG (ref 27–31)
MCH RBC QN AUTO: 31.2 PG (ref 27–31)
MCH RBC QN AUTO: 31.4 PG (ref 27–31)
MCH RBC QN AUTO: 31.7 PG (ref 27–31)
MCH RBC QN AUTO: 31.7 PG (ref 27–31)
MCH RBC QN AUTO: 31.9 PG (ref 27–31)
MCHC RBC AUTO-ENTMCNC: 28.4 % (ref 32–36)
MCHC RBC AUTO-ENTMCNC: 29.7 % (ref 32–36)
MCHC RBC AUTO-ENTMCNC: 29.8 % (ref 32–36)
MCHC RBC AUTO-ENTMCNC: 30.1 % (ref 32–36)
MCHC RBC AUTO-ENTMCNC: 30.2 % (ref 32–36)
MCHC RBC AUTO-ENTMCNC: 30.2 % (ref 32–36)
MCHC RBC AUTO-ENTMCNC: 30.3 % (ref 32–36)
MCHC RBC AUTO-ENTMCNC: 30.3 % (ref 32–36)
MCHC RBC AUTO-ENTMCNC: 30.4 % (ref 32–36)
MCHC RBC AUTO-ENTMCNC: 30.4 % (ref 32–36)
MCHC RBC AUTO-ENTMCNC: 30.7 % (ref 32–36)
MCHC RBC AUTO-ENTMCNC: 30.9 % (ref 32–36)
MCHC RBC AUTO-ENTMCNC: 30.9 % (ref 32–36)
MCHC RBC AUTO-ENTMCNC: 31.1 % (ref 32–36)
MCHC RBC AUTO-ENTMCNC: 31.7 % (ref 32–36)
MCV RBC AUTO: 100.3 FL (ref 78–100)
MCV RBC AUTO: 100.3 FL (ref 78–100)
MCV RBC AUTO: 101.8 FL (ref 78–100)
MCV RBC AUTO: 102 FL (ref 78–100)
MCV RBC AUTO: 102.6 FL (ref 78–100)
MCV RBC AUTO: 103.5 FL (ref 78–100)
MCV RBC AUTO: 112.5 FL (ref 78–100)
MCV RBC AUTO: 94 FL (ref 78–100)
MCV RBC AUTO: 95.9 FL (ref 78–100)
MCV RBC AUTO: 96 FL (ref 78–100)
MCV RBC AUTO: 96.2 FL (ref 78–100)
MCV RBC AUTO: 96.8 FL (ref 78–100)
MCV RBC AUTO: 97.2 FL (ref 78–100)
MCV RBC AUTO: 97.9 FL (ref 78–100)
MCV RBC AUTO: 98.2 FL (ref 78–100)
MCV RBC AUTO: 98.9 FL (ref 78–100)
MCV RBC AUTO: 99 FL (ref 78–100)
MCV RBC AUTO: 99.7 FL (ref 78–100)
MONOCYTES ABSOLUTE: 0.2 K/CU MM
MONOCYTES ABSOLUTE: 0.3 K/CU MM
MONOCYTES ABSOLUTE: 0.4 K/CU MM
MONOCYTES ABSOLUTE: 0.5 K/CU MM
MONOCYTES ABSOLUTE: 0.8 K/CU MM
MONOCYTES RELATIVE PERCENT: 1 % (ref 0–4)
MONOCYTES RELATIVE PERCENT: 3.5 % (ref 0–4)
MONOCYTES RELATIVE PERCENT: 3.6 % (ref 0–4)
MONOCYTES RELATIVE PERCENT: 3.6 % (ref 0–4)
MONOCYTES RELATIVE PERCENT: 3.7 % (ref 0–4)
MONOCYTES RELATIVE PERCENT: 3.8 % (ref 0–4)
MONOCYTES RELATIVE PERCENT: 4 % (ref 0–4)
MONOCYTES RELATIVE PERCENT: 4.1 % (ref 0–4)
MONOCYTES RELATIVE PERCENT: 4.4 % (ref 0–4)
MONOCYTES RELATIVE PERCENT: 4.6 % (ref 0–4)
MONOCYTES RELATIVE PERCENT: 5.2 % (ref 0–4)
MONOCYTES RELATIVE PERCENT: 5.9 % (ref 0–4)
MONOCYTES RELATIVE PERCENT: 6 % (ref 0–4)
MONOCYTES RELATIVE PERCENT: 6.7 % (ref 0–4)
MONOCYTES RELATIVE PERCENT: 7.5 % (ref 0–4)
MUCUS: ABNORMAL HPF
MYCOPLASMA PNEUMONIAE PCR: NOT DETECTED
NITRITE URINE, QUANTITATIVE: NEGATIVE
NUCLEATED RBC %: 0 %
NUCLEATED RED BLOOD CELLS: 1
O2 SAT, VEN: 94.9 % (ref 50–70)
OVALOCYTES: ABNORMAL
PARAINFLUENZA 1 PCR: NOT DETECTED
PARAINFLUENZA 2 PCR: NOT DETECTED
PARAINFLUENZA 3 PCR: NOT DETECTED
PARAINFLUENZA 4 PCR: NOT DETECTED
PCO2, VEN: 39 MMHG (ref 38–52)
PDW BLD-RTO: 19.3 % (ref 11.7–14.9)
PDW BLD-RTO: 19.9 % (ref 11.7–14.9)
PDW BLD-RTO: 20.1 % (ref 11.7–14.9)
PDW BLD-RTO: 20.1 % (ref 11.7–14.9)
PDW BLD-RTO: 20.2 % (ref 11.7–14.9)
PDW BLD-RTO: 20.8 % (ref 11.7–14.9)
PDW BLD-RTO: 21 % (ref 11.7–14.9)
PDW BLD-RTO: 21.1 % (ref 11.7–14.9)
PDW BLD-RTO: 21.2 % (ref 11.7–14.9)
PDW BLD-RTO: 21.3 % (ref 11.7–14.9)
PDW BLD-RTO: 21.3 % (ref 11.7–14.9)
PDW BLD-RTO: 21.8 % (ref 11.7–14.9)
PDW BLD-RTO: 22.3 % (ref 11.7–14.9)
PDW BLD-RTO: 22.5 % (ref 11.7–14.9)
PDW BLD-RTO: 22.5 % (ref 11.7–14.9)
PDW BLD-RTO: 22.6 % (ref 11.7–14.9)
PDW BLD-RTO: 23.2 % (ref 11.7–14.9)
PDW BLD-RTO: 23.3 % (ref 11.7–14.9)
PH VENOUS: 7.36 (ref 7.32–7.42)
PH, URINE: 5 (ref 5–8)
PHOSPHORUS: 1.7 MG/DL (ref 2.5–4.9)
PHOSPHORUS: 2.7 MG/DL (ref 2.5–4.9)
PHOSPHORUS: 3.3 MG/DL (ref 2.5–4.9)
PHOSPHORUS: 5.7 MG/DL (ref 2.5–4.9)
PLATELET # BLD: 111 K/CU MM (ref 140–440)
PLATELET # BLD: 124 K/CU MM (ref 140–440)
PLATELET # BLD: 126 K/CU MM (ref 140–440)
PLATELET # BLD: 23 K/CU MM (ref 140–440)
PLATELET # BLD: 29 K/CU MM (ref 140–440)
PLATELET # BLD: 33 K/CU MM (ref 140–440)
PLATELET # BLD: 39 K/CU MM (ref 140–440)
PLATELET # BLD: 55 K/CU MM (ref 140–440)
PLATELET # BLD: 59 K/CU MM (ref 140–440)
PLATELET # BLD: 60 K/CU MM (ref 140–440)
PLATELET # BLD: 61 K/CU MM (ref 140–440)
PLATELET # BLD: 66 K/CU MM (ref 140–440)
PLATELET # BLD: 83 K/CU MM (ref 140–440)
PLATELET # BLD: ABNORMAL K/CU MM (ref 140–440)
PLT MORPHOLOGY: ABNORMAL
PO2, VEN: 194 MMHG (ref 28–48)
POC CREATININE: 0.8 MG/DL (ref 0.6–1.1)
POLYCHROMASIA: ABNORMAL
POTASSIUM SERPL-SCNC: 2.9 MMOL/L (ref 3.5–5.1)
POTASSIUM SERPL-SCNC: 3.2 MMOL/L (ref 3.5–5.1)
POTASSIUM SERPL-SCNC: 3.3 MMOL/L (ref 3.5–5.1)
POTASSIUM SERPL-SCNC: 3.4 MMOL/L (ref 3.5–5.1)
POTASSIUM SERPL-SCNC: 3.5 MMOL/L (ref 3.5–5.1)
POTASSIUM SERPL-SCNC: 3.5 MMOL/L (ref 3.5–5.1)
POTASSIUM SERPL-SCNC: 3.6 MMOL/L (ref 3.5–5.1)
POTASSIUM SERPL-SCNC: 3.6 MMOL/L (ref 3.5–5.1)
POTASSIUM SERPL-SCNC: 3.9 MMOL/L (ref 3.5–5.1)
POTASSIUM SERPL-SCNC: 3.9 MMOL/L (ref 3.5–5.1)
POTASSIUM SERPL-SCNC: 4 MMOL/L (ref 3.5–5.1)
POTASSIUM SERPL-SCNC: 4.1 MMOL/L (ref 3.5–5.1)
POTASSIUM SERPL-SCNC: 4.2 MMOL/L (ref 3.5–5.1)
POTASSIUM SERPL-SCNC: 4.3 MMOL/L (ref 3.5–5.1)
POTASSIUM SERPL-SCNC: ABNORMAL MMOL/L (ref 3.5–5.1)
PRO-BNP: 3912 PG/ML
PRO-BNP: 691.1 PG/ML
PROTEIN UA: 30 MG/DL
PROTEIN UA: 30 MG/DL
PROTEIN UA: NEGATIVE MG/DL
PROTHROMBIN TIME: 17.8 SECONDS (ref 9.12–12.5)
RBC # BLD: 2.63 M/CU MM (ref 4.2–5.4)
RBC # BLD: 2.65 M/CU MM (ref 4.2–5.4)
RBC # BLD: 2.8 M/CU MM (ref 4.2–5.4)
RBC # BLD: 2.82 M/CU MM (ref 4.2–5.4)
RBC # BLD: 2.87 M/CU MM (ref 4.2–5.4)
RBC # BLD: 2.88 M/CU MM (ref 4.2–5.4)
RBC # BLD: 2.88 M/CU MM (ref 4.2–5.4)
RBC # BLD: 2.89 M/CU MM (ref 4.2–5.4)
RBC # BLD: 2.92 M/CU MM (ref 4.2–5.4)
RBC # BLD: 3.03 M/CU MM (ref 4.2–5.4)
RBC # BLD: 3.09 M/CU MM (ref 4.2–5.4)
RBC # BLD: 3.12 M/CU MM (ref 4.2–5.4)
RBC # BLD: 3.19 M/CU MM (ref 4.2–5.4)
RBC # BLD: 3.26 M/CU MM (ref 4.2–5.4)
RBC # BLD: 3.31 M/CU MM (ref 4.2–5.4)
RBC # BLD: 3.33 M/CU MM (ref 4.2–5.4)
RBC # BLD: 3.4 M/CU MM (ref 4.2–5.4)
RBC # BLD: 4.17 M/CU MM (ref 4.2–5.4)
RBC # BLD: ABNORMAL 10*6/UL
RBC # BLD: ABNORMAL 10*6/UL
RBC URINE: 1 /HPF (ref 0–6)
REPORT STATUS: NORMAL
RETICULOCYTE COUNT PCT: 2.6 % (ref 0.2–2.2)
RHINOVIRUS ENTEROVIRUS PCR: NOT DETECTED
RSV PCR: NOT DETECTED
SEGMENTED NEUTROPHILS ABSOLUTE COUNT: 1.2 K/CU MM
SEGMENTED NEUTROPHILS ABSOLUTE COUNT: 1.4 K/CU MM
SEGMENTED NEUTROPHILS ABSOLUTE COUNT: 10.9 K/CU MM
SEGMENTED NEUTROPHILS ABSOLUTE COUNT: 16.6 K/CU MM
SEGMENTED NEUTROPHILS ABSOLUTE COUNT: 2.1 K/CU MM
SEGMENTED NEUTROPHILS ABSOLUTE COUNT: 2.5 K/CU MM
SEGMENTED NEUTROPHILS ABSOLUTE COUNT: 2.6 K/CU MM
SEGMENTED NEUTROPHILS ABSOLUTE COUNT: 3 K/CU MM
SEGMENTED NEUTROPHILS ABSOLUTE COUNT: 3.4 K/CU MM
SEGMENTED NEUTROPHILS ABSOLUTE COUNT: 5.5 K/CU MM
SEGMENTED NEUTROPHILS ABSOLUTE COUNT: 5.8 K/CU MM
SEGMENTED NEUTROPHILS ABSOLUTE COUNT: 6.1 K/CU MM
SEGMENTED NEUTROPHILS ABSOLUTE COUNT: 6.2 K/CU MM
SEGMENTED NEUTROPHILS ABSOLUTE COUNT: 6.7 K/CU MM
SEGMENTED NEUTROPHILS ABSOLUTE COUNT: 7 K/CU MM
SEGMENTED NEUTROPHILS ABSOLUTE COUNT: 8 K/CU MM
SEGMENTED NEUTROPHILS ABSOLUTE COUNT: 9.9 K/CU MM
SEGMENTED NEUTROPHILS ABSOLUTE COUNT: ABNORMAL
SEGMENTED NEUTROPHILS RELATIVE PERCENT: 38.8 % (ref 36–66)
SEGMENTED NEUTROPHILS RELATIVE PERCENT: 40.9 % (ref 36–66)
SEGMENTED NEUTROPHILS RELATIVE PERCENT: 49 % (ref 36–66)
SEGMENTED NEUTROPHILS RELATIVE PERCENT: 50.8 % (ref 36–66)
SEGMENTED NEUTROPHILS RELATIVE PERCENT: 58.3 % (ref 36–66)
SEGMENTED NEUTROPHILS RELATIVE PERCENT: 70.3 % (ref 36–66)
SEGMENTED NEUTROPHILS RELATIVE PERCENT: 71 % (ref 36–66)
SEGMENTED NEUTROPHILS RELATIVE PERCENT: 74.7 % (ref 36–66)
SEGMENTED NEUTROPHILS RELATIVE PERCENT: 75.6 % (ref 36–66)
SEGMENTED NEUTROPHILS RELATIVE PERCENT: 77.9 % (ref 36–66)
SEGMENTED NEUTROPHILS RELATIVE PERCENT: 80.1 % (ref 36–66)
SEGMENTED NEUTROPHILS RELATIVE PERCENT: 80.4 % (ref 36–66)
SEGMENTED NEUTROPHILS RELATIVE PERCENT: 81.2 % (ref 36–66)
SEGMENTED NEUTROPHILS RELATIVE PERCENT: 81.7 % (ref 36–66)
SEGMENTED NEUTROPHILS RELATIVE PERCENT: 81.8 % (ref 36–66)
SEGMENTED NEUTROPHILS RELATIVE PERCENT: 84.2 % (ref 36–66)
SEGMENTED NEUTROPHILS RELATIVE PERCENT: 92 % (ref 36–66)
SODIUM BLD-SCNC: 135 MMOL/L (ref 135–145)
SODIUM BLD-SCNC: 135 MMOL/L (ref 135–145)
SODIUM BLD-SCNC: 138 MMOL/L (ref 135–145)
SODIUM BLD-SCNC: 139 MMOL/L (ref 135–145)
SODIUM BLD-SCNC: 139 MMOL/L (ref 135–145)
SODIUM BLD-SCNC: 140 MMOL/L (ref 135–145)
SODIUM BLD-SCNC: 141 MMOL/L (ref 135–145)
SODIUM BLD-SCNC: 142 MMOL/L (ref 135–145)
SODIUM BLD-SCNC: 143 MMOL/L (ref 135–145)
SODIUM BLD-SCNC: 144 MMOL/L (ref 135–145)
SODIUM BLD-SCNC: 145 MMOL/L (ref 135–145)
SODIUM BLD-SCNC: 147 MMOL/L (ref 135–145)
SODIUM BLD-SCNC: 149 MMOL/L (ref 135–145)
SODIUM BLD-SCNC: 151 MMOL/L (ref 135–145)
SODIUM BLD-SCNC: 152 MMOL/L (ref 135–145)
SODIUM BLD-SCNC: 152 MMOL/L (ref 135–145)
SPECIFIC GRAVITY UA: 1.02 (ref 1–1.03)
SPECIMEN: ABNORMAL
SPECIMEN: NORMAL
SPHEROCYTES: ABNORMAL
SPHEROCYTES: ABNORMAL
SQUAMOUS EPITHELIAL: 1 /HPF
SQUAMOUS EPITHELIAL: 2 /HPF
SQUAMOUS EPITHELIAL: 3 /HPF
STREP PNEUMONIAE ANTIGEN: NORMAL
TARGET CELLS: ABNORMAL
TARGET CELLS: ABNORMAL
TEAR DROP CELLS: ABNORMAL
TOTAL CK: 168 IU/L (ref 26–140)
TOTAL CK: 32 IU/L (ref 26–140)
TOTAL CK: 323 IU/L (ref 26–140)
TOTAL CK: 76 IU/L (ref 26–140)
TOTAL COLONY COUNT: ABNORMAL
TOTAL IMMATURE NEUTOROPHIL: 0.01 K/CU MM
TOTAL IMMATURE NEUTOROPHIL: 0.01 K/CU MM
TOTAL IMMATURE NEUTOROPHIL: 0.02 K/CU MM
TOTAL IMMATURE NEUTOROPHIL: 0.03 K/CU MM
TOTAL IMMATURE NEUTOROPHIL: 0.04 K/CU MM
TOTAL IMMATURE NEUTOROPHIL: 0.04 K/CU MM
TOTAL IMMATURE NEUTOROPHIL: 0.06 K/CU MM
TOTAL IMMATURE NEUTOROPHIL: 0.06 K/CU MM
TOTAL IMMATURE NEUTOROPHIL: 0.07 K/CU MM
TOTAL IMMATURE NEUTOROPHIL: 0.13 K/CU MM
TOTAL NUCLEATED RBC: 0 K/CU MM
TOTAL PROTEIN: 4.6 GM/DL (ref 6.4–8.2)
TOTAL PROTEIN: 4.8 GM/DL (ref 6.4–8.2)
TOTAL PROTEIN: 4.9 GM/DL (ref 6.4–8.2)
TOTAL PROTEIN: 5.2 GM/DL (ref 6.4–8.2)
TOTAL PROTEIN: 5.4 GM/DL (ref 6.4–8.2)
TOTAL PROTEIN: 5.6 GM/DL (ref 6.4–8.2)
TOTAL PROTEIN: 6.8 GM/DL (ref 6.4–8.2)
TRANSITIONAL EPITHELIAL: <1 /HPF
TRICHOMONAS: ABNORMAL /HPF
TROPONIN T: 0.02 NG/ML
TROPONIN T: <0.01 NG/ML
TSH HIGH SENSITIVITY: 2.41 UIU/ML (ref 0.27–4.2)
UROBILINOGEN, URINE: 2 MG/DL (ref 0.2–1)
VITAMIN B-12: >2000 PG/ML (ref 211–911)
WBC # BLD: 10.5 K/CU MM (ref 4–10.5)
WBC # BLD: 11 K/CU MM (ref 4–10.5)
WBC # BLD: 12.1 K/CU MM (ref 4–10.5)
WBC # BLD: 13.4 K/CU MM (ref 4–10.5)
WBC # BLD: 18 K/CU MM (ref 4–10.5)
WBC # BLD: 3 K/CU MM (ref 4–10.5)
WBC # BLD: 3.5 K/CU MM (ref 4–10.5)
WBC # BLD: 3.6 K/CU MM (ref 4–10.5)
WBC # BLD: 4.2 K/CU MM (ref 4–10.5)
WBC # BLD: 4.5 K/CU MM (ref 4–10.5)
WBC # BLD: 4.8 K/CU MM (ref 4–10.5)
WBC # BLD: 5.2 K/CU MM (ref 4–10.5)
WBC # BLD: 6.7 K/CU MM (ref 4–10.5)
WBC # BLD: 7.2 K/CU MM (ref 4–10.5)
WBC # BLD: 7.7 K/CU MM (ref 4–10.5)
WBC # BLD: 8.3 K/CU MM (ref 4–10.5)
WBC # BLD: 8.6 K/CU MM (ref 4–10.5)
WBC # BLD: 8.6 K/CU MM (ref 4–10.5)
WBC UA: 1 /HPF (ref 0–5)
WBC UA: 1 /HPF (ref 0–5)
WBC UA: 2 /HPF (ref 0–5)

## 2019-01-01 PROCEDURE — 84484 ASSAY OF TROPONIN QUANT: CPT

## 2019-01-01 PROCEDURE — 70553 MRI BRAIN STEM W/O & W/DYE: CPT

## 2019-01-01 PROCEDURE — 6370000000 HC RX 637 (ALT 250 FOR IP): Performed by: INTERNAL MEDICINE

## 2019-01-01 PROCEDURE — 85025 COMPLETE CBC W/AUTO DIFF WBC: CPT

## 2019-01-01 PROCEDURE — 97166 OT EVAL MOD COMPLEX 45 MIN: CPT

## 2019-01-01 PROCEDURE — 80048 BASIC METABOLIC PNL TOTAL CA: CPT

## 2019-01-01 PROCEDURE — 82140 ASSAY OF AMMONIA: CPT

## 2019-01-01 PROCEDURE — 99221 1ST HOSP IP/OBS SF/LOW 40: CPT | Performed by: FAMILY MEDICINE

## 2019-01-01 PROCEDURE — 70450 CT HEAD/BRAIN W/O DYE: CPT

## 2019-01-01 PROCEDURE — 82728 ASSAY OF FERRITIN: CPT

## 2019-01-01 PROCEDURE — 82607 VITAMIN B-12: CPT

## 2019-01-01 PROCEDURE — 6360000002 HC RX W HCPCS: Performed by: HOSPITALIST

## 2019-01-01 PROCEDURE — 87086 URINE CULTURE/COLONY COUNT: CPT

## 2019-01-01 PROCEDURE — 96365 THER/PROPH/DIAG IV INF INIT: CPT

## 2019-01-01 PROCEDURE — 72125 CT NECK SPINE W/O DYE: CPT

## 2019-01-01 PROCEDURE — 83605 ASSAY OF LACTIC ACID: CPT

## 2019-01-01 PROCEDURE — 2580000003 HC RX 258: Performed by: EMERGENCY MEDICINE

## 2019-01-01 PROCEDURE — 84100 ASSAY OF PHOSPHORUS: CPT

## 2019-01-01 PROCEDURE — 36415 COLL VENOUS BLD VENIPUNCTURE: CPT

## 2019-01-01 PROCEDURE — 1200000000 HC SEMI PRIVATE

## 2019-01-01 PROCEDURE — 71045 X-RAY EXAM CHEST 1 VIEW: CPT

## 2019-01-01 PROCEDURE — 87798 DETECT AGENT NOS DNA AMP: CPT

## 2019-01-01 PROCEDURE — 2580000003 HC RX 258: Performed by: HOSPITALIST

## 2019-01-01 PROCEDURE — 82805 BLOOD GASES W/O2 SATURATION: CPT

## 2019-01-01 PROCEDURE — 85730 THROMBOPLASTIN TIME PARTIAL: CPT

## 2019-01-01 PROCEDURE — 2580000003 HC RX 258: Performed by: INTERNAL MEDICINE

## 2019-01-01 PROCEDURE — 83690 ASSAY OF LIPASE: CPT

## 2019-01-01 PROCEDURE — 93005 ELECTROCARDIOGRAM TRACING: CPT | Performed by: EMERGENCY MEDICINE

## 2019-01-01 PROCEDURE — 87581 M.PNEUMON DNA AMP PROBE: CPT

## 2019-01-01 PROCEDURE — 96361 HYDRATE IV INFUSION ADD-ON: CPT

## 2019-01-01 PROCEDURE — 83735 ASSAY OF MAGNESIUM: CPT

## 2019-01-01 PROCEDURE — 97530 THERAPEUTIC ACTIVITIES: CPT

## 2019-01-01 PROCEDURE — 97535 SELF CARE MNGMENT TRAINING: CPT

## 2019-01-01 PROCEDURE — 6370000000 HC RX 637 (ALT 250 FOR IP): Performed by: HOSPITALIST

## 2019-01-01 PROCEDURE — 94761 N-INVAS EAR/PLS OXIMETRY MLT: CPT

## 2019-01-01 PROCEDURE — 6360000002 HC RX W HCPCS: Performed by: EMERGENCY MEDICINE

## 2019-01-01 PROCEDURE — 99232 SBSQ HOSP IP/OBS MODERATE 35: CPT | Performed by: INTERNAL MEDICINE

## 2019-01-01 PROCEDURE — 2580000003 HC RX 258: Performed by: FAMILY MEDICINE

## 2019-01-01 PROCEDURE — 97162 PT EVAL MOD COMPLEX 30 MIN: CPT

## 2019-01-01 PROCEDURE — 80053 COMPREHEN METABOLIC PANEL: CPT

## 2019-01-01 PROCEDURE — 93010 ELECTROCARDIOGRAM REPORT: CPT | Performed by: INTERNAL MEDICINE

## 2019-01-01 PROCEDURE — 1250000000 HC SEMI PRIVATE HOSPICE R&B

## 2019-01-01 PROCEDURE — 87186 SC STD MICRODIL/AGAR DIL: CPT

## 2019-01-01 PROCEDURE — 85610 PROTHROMBIN TIME: CPT

## 2019-01-01 PROCEDURE — 82550 ASSAY OF CK (CPK): CPT

## 2019-01-01 PROCEDURE — 6360000004 HC RX CONTRAST MEDICATION: Performed by: INTERNAL MEDICINE

## 2019-01-01 PROCEDURE — 85007 BL SMEAR W/DIFF WBC COUNT: CPT

## 2019-01-01 PROCEDURE — 84443 ASSAY THYROID STIM HORMONE: CPT

## 2019-01-01 PROCEDURE — 2000000000 HC ICU R&B

## 2019-01-01 PROCEDURE — 87486 CHLMYD PNEUM DNA AMP PROBE: CPT

## 2019-01-01 PROCEDURE — 85045 AUTOMATED RETICULOCYTE COUNT: CPT

## 2019-01-01 PROCEDURE — 99231 SBSQ HOSP IP/OBS SF/LOW 25: CPT | Performed by: FAMILY MEDICINE

## 2019-01-01 PROCEDURE — A9577 INJ MULTIHANCE: HCPCS | Performed by: INTERNAL MEDICINE

## 2019-01-01 PROCEDURE — 96360 HYDRATION IV INFUSION INIT: CPT

## 2019-01-01 PROCEDURE — 87899 AGENT NOS ASSAY W/OPTIC: CPT

## 2019-01-01 PROCEDURE — 99285 EMERGENCY DEPT VISIT HI MDM: CPT

## 2019-01-01 PROCEDURE — 6360000002 HC RX W HCPCS: Performed by: FAMILY MEDICINE

## 2019-01-01 PROCEDURE — 74177 CT ABD & PELVIS W/CONTRAST: CPT

## 2019-01-01 PROCEDURE — 87040 BLOOD CULTURE FOR BACTERIA: CPT

## 2019-01-01 PROCEDURE — 85027 COMPLETE CBC AUTOMATED: CPT

## 2019-01-01 PROCEDURE — 83615 LACTATE (LD) (LDH) ENZYME: CPT

## 2019-01-01 PROCEDURE — 81001 URINALYSIS AUTO W/SCOPE: CPT

## 2019-01-01 PROCEDURE — 97116 GAIT TRAINING THERAPY: CPT

## 2019-01-01 PROCEDURE — 82962 GLUCOSE BLOOD TEST: CPT

## 2019-01-01 PROCEDURE — 94760 N-INVAS EAR/PLS OXIMETRY 1: CPT

## 2019-01-01 PROCEDURE — 99231 SBSQ HOSP IP/OBS SF/LOW 25: CPT | Performed by: SURGERY

## 2019-01-01 PROCEDURE — 4500000027

## 2019-01-01 PROCEDURE — 86901 BLOOD TYPING SEROLOGIC RH(D): CPT

## 2019-01-01 PROCEDURE — 2500000003 HC RX 250 WO HCPCS: Performed by: INTERNAL MEDICINE

## 2019-01-01 PROCEDURE — 87077 CULTURE AEROBIC IDENTIFY: CPT

## 2019-01-01 PROCEDURE — 86900 BLOOD TYPING SEROLOGIC ABO: CPT

## 2019-01-01 PROCEDURE — 93005 ELECTROCARDIOGRAM TRACING: CPT | Performed by: INTERNAL MEDICINE

## 2019-01-01 PROCEDURE — 82746 ASSAY OF FOLIC ACID SERUM: CPT

## 2019-01-01 PROCEDURE — 86850 RBC ANTIBODY SCREEN: CPT

## 2019-01-01 PROCEDURE — 80069 RENAL FUNCTION PANEL: CPT

## 2019-01-01 PROCEDURE — 74019 RADEX ABDOMEN 2 VIEWS: CPT

## 2019-01-01 PROCEDURE — 94640 AIRWAY INHALATION TREATMENT: CPT

## 2019-01-01 PROCEDURE — 99254 IP/OBS CNSLTJ NEW/EST MOD 60: CPT | Performed by: SURGERY

## 2019-01-01 PROCEDURE — 71260 CT THORAX DX C+: CPT

## 2019-01-01 PROCEDURE — 83010 ASSAY OF HAPTOGLOBIN QUANT: CPT

## 2019-01-01 PROCEDURE — 74176 CT ABD & PELVIS W/O CONTRAST: CPT

## 2019-01-01 PROCEDURE — 83880 ASSAY OF NATRIURETIC PEPTIDE: CPT

## 2019-01-01 PROCEDURE — 70491 CT SOFT TISSUE NECK W/DYE: CPT

## 2019-01-01 PROCEDURE — 70486 CT MAXILLOFACIAL W/O DYE: CPT

## 2019-01-01 PROCEDURE — 99223 1ST HOSP IP/OBS HIGH 75: CPT | Performed by: INTERNAL MEDICINE

## 2019-01-01 PROCEDURE — 87449 NOS EACH ORGANISM AG IA: CPT

## 2019-01-01 PROCEDURE — 84295 ASSAY OF SERUM SODIUM: CPT

## 2019-01-01 PROCEDURE — 71250 CT THORAX DX C-: CPT

## 2019-01-01 PROCEDURE — 99291 CRITICAL CARE FIRST HOUR: CPT

## 2019-01-01 PROCEDURE — 93306 TTE W/DOPPLER COMPLETE: CPT

## 2019-01-01 PROCEDURE — 6370000000 HC RX 637 (ALT 250 FOR IP): Performed by: EMERGENCY MEDICINE

## 2019-01-01 RX ORDER — MORPHINE SULFATE/0.9% NACL/PF 1 MG/ML
SYRINGE (ML) INJECTION CONTINUOUS
Status: DISCONTINUED | OUTPATIENT
Start: 2019-01-01 | End: 2019-01-01 | Stop reason: HOSPADM

## 2019-01-01 RX ORDER — LACTULOSE 10 G/15ML
10 SOLUTION ORAL 3 TIMES DAILY
Status: DISCONTINUED | OUTPATIENT
Start: 2019-01-01 | End: 2019-01-01 | Stop reason: HOSPADM

## 2019-01-01 RX ORDER — HALOPERIDOL 5 MG/ML
1 INJECTION INTRAMUSCULAR
Status: CANCELLED | OUTPATIENT
Start: 2019-01-01

## 2019-01-01 RX ORDER — IPRATROPIUM BROMIDE AND ALBUTEROL SULFATE 2.5; .5 MG/3ML; MG/3ML
1 SOLUTION RESPIRATORY (INHALATION) EVERY 4 HOURS PRN
Status: CANCELLED | OUTPATIENT
Start: 2019-01-01

## 2019-01-01 RX ORDER — PSEUDOEPHEDRINE HCL 30 MG
100 TABLET ORAL DAILY
Qty: 60 CAPSULE | Refills: 0 | Status: ON HOLD | OUTPATIENT
Start: 2019-01-01 | End: 2019-01-01

## 2019-01-01 RX ORDER — HYDROCODONE BITARTRATE AND ACETAMINOPHEN 5; 325 MG/1; MG/1
1 TABLET ORAL EVERY 6 HOURS PRN
Status: DISCONTINUED | OUTPATIENT
Start: 2019-01-01 | End: 2019-01-01

## 2019-01-01 RX ORDER — ONDANSETRON 2 MG/ML
4 INJECTION INTRAMUSCULAR; INTRAVENOUS EVERY 6 HOURS PRN
Status: DISCONTINUED | OUTPATIENT
Start: 2019-01-01 | End: 2019-01-01 | Stop reason: HOSPADM

## 2019-01-01 RX ORDER — DOCUSATE SODIUM 100 MG/1
100 CAPSULE, LIQUID FILLED ORAL DAILY
Status: DISCONTINUED | OUTPATIENT
Start: 2019-01-01 | End: 2019-01-01 | Stop reason: HOSPADM

## 2019-01-01 RX ORDER — IPRATROPIUM BROMIDE AND ALBUTEROL SULFATE 2.5; .5 MG/3ML; MG/3ML
1 SOLUTION RESPIRATORY (INHALATION) EVERY 4 HOURS PRN
Status: DISCONTINUED | OUTPATIENT
Start: 2019-01-01 | End: 2019-01-01 | Stop reason: HOSPADM

## 2019-01-01 RX ORDER — HEPARIN SODIUM 5000 [USP'U]/ML
5000 INJECTION, SOLUTION INTRAVENOUS; SUBCUTANEOUS EVERY 8 HOURS SCHEDULED
Status: DISCONTINUED | OUTPATIENT
Start: 2019-01-01 | End: 2019-01-01

## 2019-01-01 RX ORDER — SODIUM CHLORIDE 9 MG/ML
INJECTION, SOLUTION INTRAVENOUS CONTINUOUS
Status: DISCONTINUED | OUTPATIENT
Start: 2019-01-01 | End: 2019-01-01 | Stop reason: HOSPADM

## 2019-01-01 RX ORDER — LORAZEPAM 2 MG/ML
0.5 INJECTION INTRAMUSCULAR
Status: CANCELLED | OUTPATIENT
Start: 2019-01-01

## 2019-01-01 RX ORDER — DILTIAZEM HYDROCHLORIDE 240 MG/1
180 CAPSULE, COATED, EXTENDED RELEASE ORAL DAILY
Qty: 60 CAPSULE | Refills: 1 | Status: SHIPPED | OUTPATIENT
Start: 2019-01-01

## 2019-01-01 RX ORDER — ACETAMINOPHEN 325 MG/1
650 TABLET ORAL EVERY 4 HOURS PRN
Status: DISCONTINUED | OUTPATIENT
Start: 2019-01-01 | End: 2019-01-01 | Stop reason: HOSPADM

## 2019-01-01 RX ORDER — MORPHINE SULFATE 4 MG/ML
1 INJECTION, SOLUTION INTRAMUSCULAR; INTRAVENOUS
Status: DISCONTINUED | OUTPATIENT
Start: 2019-01-01 | End: 2019-01-01 | Stop reason: HOSPADM

## 2019-01-01 RX ORDER — 0.9 % SODIUM CHLORIDE 0.9 %
1000 INTRAVENOUS SOLUTION INTRAVENOUS ONCE
Status: COMPLETED | OUTPATIENT
Start: 2019-01-01 | End: 2019-01-01

## 2019-01-01 RX ORDER — MORPHINE SULFATE 4 MG/ML
2 INJECTION, SOLUTION INTRAMUSCULAR; INTRAVENOUS
Status: DISCONTINUED | OUTPATIENT
Start: 2019-01-01 | End: 2019-01-01 | Stop reason: HOSPADM

## 2019-01-01 RX ORDER — SODIUM CHLORIDE 9 MG/ML
INJECTION, SOLUTION INTRAVENOUS CONTINUOUS
Status: CANCELLED | OUTPATIENT
Start: 2019-01-01

## 2019-01-01 RX ORDER — POTASSIUM CHLORIDE 20 MEQ/1
40 TABLET, EXTENDED RELEASE ORAL ONCE
Status: COMPLETED | OUTPATIENT
Start: 2019-01-01 | End: 2019-01-01

## 2019-01-01 RX ORDER — LACTULOSE 10 G/15ML
20 SOLUTION ORAL ONCE
Status: DISCONTINUED | OUTPATIENT
Start: 2019-01-01 | End: 2019-01-01

## 2019-01-01 RX ORDER — SODIUM CHLORIDE 0.9 % (FLUSH) 0.9 %
10 SYRINGE (ML) INJECTION EVERY 12 HOURS SCHEDULED
Status: DISCONTINUED | OUTPATIENT
Start: 2019-01-01 | End: 2019-01-01 | Stop reason: HOSPADM

## 2019-01-01 RX ORDER — SODIUM CHLORIDE 0.9 % (FLUSH) 0.9 %
10 SYRINGE (ML) INJECTION EVERY 12 HOURS SCHEDULED
Status: DISCONTINUED | OUTPATIENT
Start: 2019-04-25 | End: 2019-01-01 | Stop reason: HOSPADM

## 2019-01-01 RX ORDER — SODIUM CHLORIDE 0.9 % (FLUSH) 0.9 %
10 SYRINGE (ML) INJECTION PRN
Status: DISCONTINUED | OUTPATIENT
Start: 2019-01-01 | End: 2019-01-01 | Stop reason: HOSPADM

## 2019-01-01 RX ORDER — DIAZEPAM 5 MG/1
5 TABLET ORAL 2 TIMES DAILY PRN
COMMUNITY

## 2019-01-01 RX ORDER — MORPHINE SULFATE 4 MG/ML
1 INJECTION, SOLUTION INTRAMUSCULAR; INTRAVENOUS
Status: CANCELLED | OUTPATIENT
Start: 2019-01-01

## 2019-01-01 RX ORDER — POTASSIUM CHLORIDE 750 MG/1
10 CAPSULE, EXTENDED RELEASE ORAL DAILY
COMMUNITY

## 2019-01-01 RX ORDER — SENNA PLUS 8.6 MG/1
1 TABLET ORAL ONCE
Status: COMPLETED | OUTPATIENT
Start: 2019-01-01 | End: 2019-01-01

## 2019-01-01 RX ORDER — MIRTAZAPINE 15 MG/1
15 TABLET, FILM COATED ORAL NIGHTLY
COMMUNITY

## 2019-01-01 RX ORDER — LACTULOSE 10 G/15ML
20 SOLUTION ORAL 3 TIMES DAILY
Status: DISCONTINUED | OUTPATIENT
Start: 2019-01-01 | End: 2019-01-01

## 2019-01-01 RX ORDER — IPRATROPIUM BROMIDE AND ALBUTEROL SULFATE 2.5; .5 MG/3ML; MG/3ML
1 SOLUTION RESPIRATORY (INHALATION) 4 TIMES DAILY
Status: DISCONTINUED | OUTPATIENT
Start: 2019-01-01 | End: 2019-01-01 | Stop reason: HOSPADM

## 2019-01-01 RX ORDER — LEVOFLOXACIN 5 MG/ML
500 INJECTION, SOLUTION INTRAVENOUS EVERY 24 HOURS
Status: DISCONTINUED | OUTPATIENT
Start: 2019-01-01 | End: 2019-01-01 | Stop reason: HOSPADM

## 2019-01-01 RX ORDER — DILTIAZEM HYDROCHLORIDE 5 MG/ML
10 INJECTION INTRAVENOUS ONCE
Status: COMPLETED | OUTPATIENT
Start: 2019-01-01 | End: 2019-01-01

## 2019-01-01 RX ORDER — POTASSIUM CHLORIDE 1.5 G/1.77G
40 POWDER, FOR SOLUTION ORAL PRN
Status: DISCONTINUED | OUTPATIENT
Start: 2019-01-01 | End: 2019-01-01 | Stop reason: HOSPADM

## 2019-01-01 RX ORDER — HALOPERIDOL 5 MG/ML
1 INJECTION INTRAMUSCULAR
Status: DISCONTINUED | OUTPATIENT
Start: 2019-01-01 | End: 2019-01-01 | Stop reason: HOSPADM

## 2019-01-01 RX ORDER — SODIUM CHLORIDE 0.9 % (FLUSH) 0.9 %
10 SYRINGE (ML) INJECTION EVERY 12 HOURS SCHEDULED
Status: CANCELLED | OUTPATIENT
Start: 2019-04-25

## 2019-01-01 RX ORDER — DIAPER,BRIEF,INFANT-TODD,DISP
EACH MISCELLANEOUS 2 TIMES DAILY
Status: DISCONTINUED | OUTPATIENT
Start: 2019-01-01 | End: 2019-01-01 | Stop reason: HOSPADM

## 2019-01-01 RX ORDER — GLYCOPYRROLATE 0.2 MG/ML
0.2 INJECTION INTRAMUSCULAR; INTRAVENOUS EVERY 4 HOURS PRN
Status: CANCELLED | OUTPATIENT
Start: 2019-01-01

## 2019-01-01 RX ORDER — ONDANSETRON 2 MG/ML
4 INJECTION INTRAMUSCULAR; INTRAVENOUS EVERY 6 HOURS PRN
Status: CANCELLED | OUTPATIENT
Start: 2019-01-01

## 2019-01-01 RX ORDER — POTASSIUM CHLORIDE 20 MEQ/1
40 TABLET, EXTENDED RELEASE ORAL PRN
Status: DISCONTINUED | OUTPATIENT
Start: 2019-01-01 | End: 2019-01-01 | Stop reason: HOSPADM

## 2019-01-01 RX ORDER — DILTIAZEM HYDROCHLORIDE 180 MG/1
180 CAPSULE, COATED, EXTENDED RELEASE ORAL DAILY
Status: DISCONTINUED | OUTPATIENT
Start: 2019-01-01 | End: 2019-01-01 | Stop reason: HOSPADM

## 2019-01-01 RX ORDER — DIAZEPAM 5 MG/1
5 TABLET ORAL 2 TIMES DAILY PRN
Qty: 15 TABLET | Refills: 0 | Status: SHIPPED | OUTPATIENT
Start: 2019-01-01 | End: 2019-01-01

## 2019-01-01 RX ORDER — SODIUM PHOSPHATE, DIBASIC AND SODIUM PHOSPHATE, MONOBASIC 7; 19 G/133ML; G/133ML
1 ENEMA RECTAL
Status: DISCONTINUED | OUTPATIENT
Start: 2019-01-01 | End: 2019-01-01

## 2019-01-01 RX ORDER — DIAZEPAM 5 MG/1
5 TABLET ORAL EVERY 12 HOURS PRN
Status: DISCONTINUED | OUTPATIENT
Start: 2019-01-01 | End: 2019-01-01 | Stop reason: HOSPADM

## 2019-01-01 RX ORDER — GLYCOPYRROLATE 1 MG/5 ML
0.2 SYRINGE (ML) INTRAVENOUS EVERY 4 HOURS PRN
Status: DISCONTINUED | OUTPATIENT
Start: 2019-01-01 | End: 2019-01-01 | Stop reason: HOSPADM

## 2019-01-01 RX ORDER — DIAPER,BRIEF,INFANT-TODD,DISP
EACH MISCELLANEOUS
Qty: 1 TUBE | Refills: 1 | Status: SHIPPED | OUTPATIENT
Start: 2019-01-01 | End: 2019-01-01

## 2019-01-01 RX ORDER — POTASSIUM CHLORIDE 20 MEQ/1
40 TABLET, EXTENDED RELEASE ORAL 2 TIMES DAILY WITH MEALS
Status: COMPLETED | OUTPATIENT
Start: 2019-01-01 | End: 2019-01-01

## 2019-01-01 RX ORDER — MORPHINE SULFATE 4 MG/ML
1 INJECTION, SOLUTION INTRAMUSCULAR; INTRAVENOUS
Status: DISCONTINUED | OUTPATIENT
Start: 2019-01-01 | End: 2019-01-01

## 2019-01-01 RX ORDER — MORPHINE SULFATE/0.9% NACL/PF 1 MG/ML
SYRINGE (ML) INJECTION CONTINUOUS
Status: CANCELLED | OUTPATIENT
Start: 2019-01-01

## 2019-01-01 RX ORDER — POTASSIUM CHLORIDE 7.45 MG/ML
10 INJECTION INTRAVENOUS PRN
Status: DISCONTINUED | OUTPATIENT
Start: 2019-01-01 | End: 2019-01-01 | Stop reason: HOSPADM

## 2019-01-01 RX ORDER — SENNA PLUS 8.6 MG/1
1 TABLET ORAL NIGHTLY
Status: DISCONTINUED | OUTPATIENT
Start: 2019-01-01 | End: 2019-01-01 | Stop reason: HOSPADM

## 2019-01-01 RX ORDER — SODIUM PHOSPHATE, DIBASIC AND SODIUM PHOSPHATE, MONOBASIC 7; 19 G/133ML; G/133ML
1 ENEMA RECTAL ONCE
Status: DISCONTINUED | OUTPATIENT
Start: 2019-01-01 | End: 2019-01-01

## 2019-01-01 RX ORDER — LORAZEPAM 2 MG/ML
0.5 INJECTION INTRAMUSCULAR
Status: DISCONTINUED | OUTPATIENT
Start: 2019-01-01 | End: 2019-01-01 | Stop reason: HOSPADM

## 2019-01-01 RX ORDER — SODIUM CHLORIDE 450 MG/100ML
INJECTION, SOLUTION INTRAVENOUS CONTINUOUS
Status: DISCONTINUED | OUTPATIENT
Start: 2019-01-01 | End: 2019-01-01

## 2019-01-01 RX ORDER — MAGNESIUM SULFATE 1 G/100ML
1 INJECTION INTRAVENOUS PRN
Status: DISCONTINUED | OUTPATIENT
Start: 2019-01-01 | End: 2019-01-01 | Stop reason: HOSPADM

## 2019-01-01 RX ORDER — MORPHINE SULFATE 4 MG/ML
2 INJECTION, SOLUTION INTRAMUSCULAR; INTRAVENOUS
Status: CANCELLED | OUTPATIENT
Start: 2019-01-01

## 2019-01-01 RX ORDER — POTASSIUM CHLORIDE 20 MEQ/1
40 TABLET, EXTENDED RELEASE ORAL 2 TIMES DAILY WITH MEALS
Status: DISCONTINUED | OUTPATIENT
Start: 2019-01-01 | End: 2019-01-01

## 2019-01-01 RX ADMIN — Medication 8.6 MG: at 21:04

## 2019-01-01 RX ADMIN — DOCUSATE SODIUM 100 MG: 100 CAPSULE, LIQUID FILLED ORAL at 15:45

## 2019-01-01 RX ADMIN — DILTIAZEM HYDROCHLORIDE 180 MG: 180 CAPSULE, COATED, EXTENDED RELEASE ORAL at 10:09

## 2019-01-01 RX ADMIN — POTASSIUM CHLORIDE 40 MEQ: 20 TABLET, EXTENDED RELEASE ORAL at 11:01

## 2019-01-01 RX ADMIN — HEPARIN SODIUM 5000 UNITS: 5000 INJECTION, SOLUTION INTRAVENOUS; SUBCUTANEOUS at 22:30

## 2019-01-01 RX ADMIN — IOHEXOL 50 ML: 240 INJECTION, SOLUTION INTRATHECAL; INTRAVASCULAR; INTRAVENOUS; ORAL at 12:46

## 2019-01-01 RX ADMIN — SENNOSIDES 8.6 MG: 8.6 TABLET, FILM COATED ORAL at 11:15

## 2019-01-01 RX ADMIN — SODIUM CHLORIDE, PRESERVATIVE FREE 10 ML: 5 INJECTION INTRAVENOUS at 21:22

## 2019-01-01 RX ADMIN — CEFEPIME HYDROCHLORIDE 2 G: 2 INJECTION, POWDER, FOR SOLUTION INTRAVENOUS at 09:34

## 2019-01-01 RX ADMIN — IPRATROPIUM BROMIDE AND ALBUTEROL SULFATE 1 AMPULE: .5; 3 SOLUTION RESPIRATORY (INHALATION) at 08:45

## 2019-01-01 RX ADMIN — MORPHINE SULFATE 2 MG: 4 INJECTION INTRAVENOUS at 09:56

## 2019-01-01 RX ADMIN — POTASSIUM CHLORIDE 40 MEQ: 20 TABLET, EXTENDED RELEASE ORAL at 18:20

## 2019-01-01 RX ADMIN — SODIUM CHLORIDE 1000 ML: 9 INJECTION, SOLUTION INTRAVENOUS at 23:27

## 2019-01-01 RX ADMIN — POTASSIUM CHLORIDE 40 MEQ: 1500 TABLET, EXTENDED RELEASE ORAL at 10:09

## 2019-01-01 RX ADMIN — HYDROCORTISONE: 1 CREAM TOPICAL at 09:20

## 2019-01-01 RX ADMIN — LACTULOSE 10 G: 10 SOLUTION ORAL at 21:09

## 2019-01-01 RX ADMIN — DILTIAZEM HYDROCHLORIDE 5 MG/HR: 5 INJECTION INTRAVENOUS at 11:00

## 2019-01-01 RX ADMIN — HEPARIN SODIUM 5000 UNITS: 5000 INJECTION, SOLUTION INTRAVENOUS; SUBCUTANEOUS at 05:25

## 2019-01-01 RX ADMIN — SODIUM CHLORIDE, PRESERVATIVE FREE 10 ML: 5 INJECTION INTRAVENOUS at 21:08

## 2019-01-01 RX ADMIN — POTASSIUM CHLORIDE 10 MEQ: 7.46 INJECTION, SOLUTION INTRAVENOUS at 12:47

## 2019-01-01 RX ADMIN — POTASSIUM CHLORIDE 40 MEQ: 1500 TABLET, EXTENDED RELEASE ORAL at 17:08

## 2019-01-01 RX ADMIN — DILTIAZEM HYDROCHLORIDE 10 MG: 5 INJECTION INTRAVENOUS at 09:15

## 2019-01-01 RX ADMIN — Medication 8.6 MG: at 21:08

## 2019-01-01 RX ADMIN — SODIUM CHLORIDE, PRESERVATIVE FREE 10 ML: 5 INJECTION INTRAVENOUS at 11:01

## 2019-01-01 RX ADMIN — SODIUM CHLORIDE: 4.5 INJECTION, SOLUTION INTRAVENOUS at 14:40

## 2019-01-01 RX ADMIN — HEPARIN SODIUM 5000 UNITS: 5000 INJECTION, SOLUTION INTRAVENOUS; SUBCUTANEOUS at 15:18

## 2019-01-01 RX ADMIN — LACTULOSE 10 G: 10 SOLUTION ORAL at 14:12

## 2019-01-01 RX ADMIN — DOCUSATE SODIUM 100 MG: 100 CAPSULE, LIQUID FILLED ORAL at 09:21

## 2019-01-01 RX ADMIN — CEFEPIME HYDROCHLORIDE 2 G: 2 INJECTION, POWDER, FOR SOLUTION INTRAVENOUS at 21:55

## 2019-01-01 RX ADMIN — IPRATROPIUM BROMIDE AND ALBUTEROL SULFATE 1 AMPULE: .5; 3 SOLUTION RESPIRATORY (INHALATION) at 12:55

## 2019-01-01 RX ADMIN — SODIUM CHLORIDE: 4.5 INJECTION, SOLUTION INTRAVENOUS at 15:30

## 2019-01-01 RX ADMIN — CEFEPIME HYDROCHLORIDE 2 G: 2 INJECTION, POWDER, FOR SOLUTION INTRAVENOUS at 10:23

## 2019-01-01 RX ADMIN — CEFTRIAXONE 2 G: 1 INJECTION, POWDER, FOR SOLUTION INTRAMUSCULAR; INTRAVENOUS at 02:16

## 2019-01-01 RX ADMIN — MORPHINE SULFATE 1 MG: 1 INJECTION INTRAVENOUS at 20:19

## 2019-01-01 RX ADMIN — SODIUM CHLORIDE, PRESERVATIVE FREE 10 ML: 5 INJECTION INTRAVENOUS at 09:48

## 2019-01-01 RX ADMIN — IPRATROPIUM BROMIDE AND ALBUTEROL SULFATE 1 AMPULE: .5; 3 SOLUTION RESPIRATORY (INHALATION) at 19:45

## 2019-01-01 RX ADMIN — VANCOMYCIN HYDROCHLORIDE 1500 MG: 5 INJECTION, POWDER, LYOPHILIZED, FOR SOLUTION INTRAVENOUS at 23:59

## 2019-01-01 RX ADMIN — SODIUM CHLORIDE, PRESERVATIVE FREE 10 ML: 5 INJECTION INTRAVENOUS at 09:35

## 2019-01-01 RX ADMIN — DILTIAZEM HYDROCHLORIDE 180 MG: 180 CAPSULE, COATED, EXTENDED RELEASE ORAL at 11:00

## 2019-01-01 RX ADMIN — HYDROCORTISONE: 1 CREAM TOPICAL at 10:09

## 2019-01-01 RX ADMIN — SODIUM CHLORIDE 1000 ML: 900 INJECTION INTRAVENOUS at 10:41

## 2019-01-01 RX ADMIN — POTASSIUM CHLORIDE 10 MEQ: 7.46 INJECTION, SOLUTION INTRAVENOUS at 10:40

## 2019-01-01 RX ADMIN — SODIUM CHLORIDE: 9 INJECTION, SOLUTION INTRAVENOUS at 17:44

## 2019-01-01 RX ADMIN — TAZOBACTAM SODIUM AND PIPERACILLIN SODIUM 3.38 G: 375; 3 INJECTION, SOLUTION INTRAVENOUS at 23:27

## 2019-01-01 RX ADMIN — SODIUM CHLORIDE, PRESERVATIVE FREE 10 ML: 5 INJECTION INTRAVENOUS at 10:12

## 2019-01-01 RX ADMIN — GADOBENATE DIMEGLUMINE 8 ML: 529 INJECTION, SOLUTION INTRAVENOUS at 15:37

## 2019-01-01 RX ADMIN — POTASSIUM CHLORIDE 10 MEQ: 7.46 INJECTION, SOLUTION INTRAVENOUS at 14:12

## 2019-01-01 RX ADMIN — LEVOFLOXACIN 500 MG: 5 INJECTION, SOLUTION INTRAVENOUS at 10:24

## 2019-01-01 RX ADMIN — VANCOMYCIN HYDROCHLORIDE 1500 MG: 5 INJECTION, POWDER, LYOPHILIZED, FOR SOLUTION INTRAVENOUS at 04:04

## 2019-01-01 RX ADMIN — DILTIAZEM HYDROCHLORIDE 180 MG: 180 CAPSULE, COATED, EXTENDED RELEASE ORAL at 09:21

## 2019-01-01 RX ADMIN — DILTIAZEM HYDROCHLORIDE 5 MG/HR: 5 INJECTION INTRAVENOUS at 20:50

## 2019-01-01 RX ADMIN — SODIUM CHLORIDE 1000 ML: 9 INJECTION, SOLUTION INTRAVENOUS at 23:45

## 2019-01-01 RX ADMIN — SODIUM CHLORIDE: 4.5 INJECTION, SOLUTION INTRAVENOUS at 03:10

## 2019-01-01 RX ADMIN — LACTULOSE 10 G: 10 SOLUTION ORAL at 15:45

## 2019-01-01 RX ADMIN — POTASSIUM CHLORIDE 10 MEQ: 7.46 INJECTION, SOLUTION INTRAVENOUS at 11:41

## 2019-01-01 RX ADMIN — SODIUM CHLORIDE: 9 INJECTION, SOLUTION INTRAVENOUS at 20:15

## 2019-01-01 RX ADMIN — MORPHINE SULFATE 30 MG: 1 INJECTION INTRAVENOUS at 17:44

## 2019-01-01 RX ADMIN — LEVOFLOXACIN 500 MG: 5 INJECTION, SOLUTION INTRAVENOUS at 10:23

## 2019-01-01 RX ADMIN — POTASSIUM CHLORIDE 40 MEQ: 1500 TABLET, EXTENDED RELEASE ORAL at 09:21

## 2019-01-01 RX ADMIN — POTASSIUM CHLORIDE 10 MEQ: 7.46 INJECTION, SOLUTION INTRAVENOUS at 09:27

## 2019-01-01 RX ADMIN — HEPARIN SODIUM 5000 UNITS: 5000 INJECTION, SOLUTION INTRAVENOUS; SUBCUTANEOUS at 13:58

## 2019-01-01 RX ADMIN — MORPHINE SULFATE 1 MG: 4 INJECTION INTRAVENOUS at 18:53

## 2019-01-01 RX ADMIN — SODIUM CHLORIDE, PRESERVATIVE FREE 10 ML: 5 INJECTION INTRAVENOUS at 22:30

## 2019-01-01 RX ADMIN — SODIUM CHLORIDE 1000 ML: 9 INJECTION, SOLUTION INTRAVENOUS at 11:57

## 2019-01-01 RX ADMIN — IOPAMIDOL 75 ML: 755 INJECTION, SOLUTION INTRAVENOUS at 12:46

## 2019-01-01 RX ADMIN — DOCUSATE SODIUM 100 MG: 100 CAPSULE, LIQUID FILLED ORAL at 08:33

## 2019-01-01 RX ADMIN — HEPARIN SODIUM 5000 UNITS: 5000 INJECTION, SOLUTION INTRAVENOUS; SUBCUTANEOUS at 14:41

## 2019-01-01 RX ADMIN — LACTULOSE: 10 SOLUTION ORAL at 12:33

## 2019-01-01 RX ADMIN — DILTIAZEM HYDROCHLORIDE 5 MG/HR: 5 INJECTION INTRAVENOUS at 09:24

## 2019-01-01 RX ADMIN — TOPICAL ANESTHETIC: 200 SPRAY DENTAL; PERIODONTAL at 23:44

## 2019-01-01 RX ADMIN — POTASSIUM CHLORIDE 10 MEQ: 7.46 INJECTION, SOLUTION INTRAVENOUS at 08:31

## 2019-01-01 ASSESSMENT — PAIN DESCRIPTION - DESCRIPTORS: DESCRIPTORS: BURNING;CONSTANT;DISCOMFORT;SHARP

## 2019-01-01 ASSESSMENT — PAIN SCALES - GENERAL
PAINLEVEL_OUTOF10: 0
PAINLEVEL_OUTOF10: 3
PAINLEVEL_OUTOF10: 0
PAINLEVEL_OUTOF10: 0
PAINLEVEL_OUTOF10: 7
PAINLEVEL_OUTOF10: 0
PAINLEVEL_OUTOF10: 2
PAINLEVEL_OUTOF10: 0
PAINLEVEL_OUTOF10: 3
PAINLEVEL_OUTOF10: 4
PAINLEVEL_OUTOF10: 0
PAINLEVEL_OUTOF10: 7
PAINLEVEL_OUTOF10: 0
PAINLEVEL_OUTOF10: 4
PAINLEVEL_OUTOF10: 0
PAINLEVEL_OUTOF10: 0
PAINLEVEL_OUTOF10: 5
PAINLEVEL_OUTOF10: 2
PAINLEVEL_OUTOF10: 0
PAINLEVEL_OUTOF10: 7
PAINLEVEL_OUTOF10: 7
PAINLEVEL_OUTOF10: 0
PAINLEVEL_OUTOF10: 5
PAINLEVEL_OUTOF10: 5
PAINLEVEL_OUTOF10: 4
PAINLEVEL_OUTOF10: 0
PAINLEVEL_OUTOF10: 7
PAINLEVEL_OUTOF10: 4
PAINLEVEL_OUTOF10: 0
PAINLEVEL_OUTOF10: 0

## 2019-01-01 ASSESSMENT — PAIN SCALES - WONG BAKER
WONGBAKER_NUMERICALRESPONSE: 0
WONGBAKER_NUMERICALRESPONSE: 0
WONGBAKER_NUMERICALRESPONSE: 2
WONGBAKER_NUMERICALRESPONSE: 2
WONGBAKER_NUMERICALRESPONSE: 0
WONGBAKER_NUMERICALRESPONSE: 4

## 2019-01-01 ASSESSMENT — PAIN DESCRIPTION - PAIN TYPE
TYPE: ACUTE PAIN
TYPE: ACUTE PAIN;CHRONIC PAIN
TYPE: CHRONIC PAIN

## 2019-01-01 ASSESSMENT — PAIN DESCRIPTION - LOCATION
LOCATION: GENERALIZED
LOCATION: BUTTOCKS
LOCATION: GENERALIZED
LOCATION: ABDOMEN;JAW
LOCATION: GENERALIZED

## 2019-01-01 ASSESSMENT — PAIN DESCRIPTION - FREQUENCY: FREQUENCY: CONTINUOUS

## 2019-01-01 ASSESSMENT — PAIN DESCRIPTION - ONSET: ONSET: ON-GOING

## 2019-03-01 PROBLEM — N17.9 ACUTE RENAL FAILURE (ARF) (HCC): Status: ACTIVE | Noted: 2019-01-01

## 2019-03-02 PROBLEM — E43 SEVERE MALNUTRITION (HCC): Chronic | Status: ACTIVE | Noted: 2019-01-01

## 2019-03-08 PROBLEM — I47.1 SVT (SUPRAVENTRICULAR TACHYCARDIA) (HCC): Status: ACTIVE | Noted: 2019-01-01

## 2019-04-21 PROBLEM — K76.82 HEPATIC ENCEPHALOPATHY: Status: ACTIVE | Noted: 2019-01-01

## 2019-04-22 NOTE — FLOWSHEET NOTE
Admitting skin check with this RN and Anahy Leslie RN; no open areas noted, half of nail to great L toe missing and bleeding (dressing applied).

## 2019-04-22 NOTE — ED NOTES
Friend at bedside     [address] [phone]  States to call with any questions        Victoria Siemens, RN  04/21/19 6519

## 2019-04-22 NOTE — ED PROVIDER NOTES
[facility]      TRIAGE CHIEF COMPLAINT:   Fatigue (per  x3 days) and Other (High ammonia levels reported today per nursing home )      [place]:  Alexandra Denny is a 78 y.o. female that presents with complaint of altered mental status, has history of liver cancer, cirrhosis, increased ammonia level in nursing home is a full code. Patient's friend is here patient on arrival is alert but not follow commands she is confused. No further HPI available. REVIEW OF SYSTEMS:      Review of Systems   Unable to perform ROS: Mental status change   Psychiatric/Behavioral: Positive for confusion.        Past Medical History:   Diagnosis Date    Anxiety     Hypertension     Neuroendocrine cancer ([facility] [address]      Past Surgical History:   Procedure Laterality Date    HYSTERECTOMY       Family History   Problem Relation Age of Onset    Heart Disease Mother     Other Father         Hodgkins     Social History     Socioeconomic History    Marital status:      Spouse name: Not on file    Number of children: Not on file    Years of education: Not on file    Highest education level: Not on file   Occupational History    Not on file   Social Needs    Financial resource strain: Not on file    Food insecurity:     Worry: Not on file     Inability: Not on file    Transportation needs:     Medical: Not on file     Non-medical: Not on file   Tobacco Use    Smoking status: Never Smoker    Smokeless tobacco: Never Used   Substance and Sexual Activity    Alcohol use: No    Drug use: No    Sexual activity: Not Currently     Partners: Male   Lifestyle    Physical activity:     Days per week: Not on file     Minutes per session: Not on file    Stress: Not on file   Relationships    Social connections:     Talks on phone: Not on file     Gets together: Not on file     Attends Jewish service: Not on file     Active member of club or organization: Not on file     Attends meetings of clubs or organizations: Not on file     Relationship status: Not on file    Intimate partner violence:     Fear of current or ex partner: Not on file     Emotionally abused: Not on file     Physically abused: Not on file     Forced sexual activity: Not on file   Other Topics Concern    Not on file   Social History Narrative    Not on file     Current Facility-Administered Medications   Medication Dose Route Frequency Provider Last Rate Last Dose    0.9 % sodium chloride bolus  1,000 mL Intravenous Once [employer], DO        0.9 % sodium chloride bolus  1,000 mL Intravenous Once [employer], DO        vancomycin (VANCOCIN) 1,500 mg in dextrose 5 % 500 mL IVPB  1,500 mg Intravenous Once [employer], DO        piperacillin-tazobactam (ZOSYN) 3.375 g in dextrose 50 mL IVPB (premix)  3.375 g Intravenous Once [employer], DO        lactulose (CHRONULAC) 10 GM/15ML solution 20 g  20 g Oral Once [employer], DO        benzocaine (HURRICAINE) 20 % oral spray   Mouth/Throat Once [employer], DO         Current Outpatient Medications   Medication Sig Dispense Refill    diltiazem (CARDIZEM CD) 240 MG extended release capsule Take 1 capsule by mouth daily 60 capsule 1    docusate sodium (COLACE, DULCOLAX) 100 MG CAPS Take 100 mg by mouth daily 60 capsule 0    Multiple Vitamins-Minerals (CENTRUM SILVER PO) Take 1 capsule by mouth      vitamin E 200 units capsule Take 200 Units by mouth daily      Omega-3 Fatty Acids (FISH OIL) 1000 MG CAPS Take 3,000 mg by mouth 3 times daily        No Known Allergies  Current Facility-Administered Medications   Medication Dose Route Frequency Provider Last Rate Last Dose    0.9 % sodium chloride bolus  1,000 mL Intravenous Once [employer], DO        0.9 % sodium chloride bolus  1,000 mL Intravenous Once [employer], DO        vancomycin (VANCOCIN) 1,500 mg in dextrose 5 % 500 mL IVPB  1,500 mg Intravenous Once [employer], DO        piperacillin-tazobactam (ZOSYN) 3.375 g in dextrose 50 mL IVPB (premix)  3.375 g Intravenous Once [employer], DO        lactulose (CHRONULAC) 10 GM/15ML solution 20 g  20 g Oral Once [employer], DO        benzocaine (HURRICAINE) 20 % oral spray   Mouth/Throat Once [employer], DO         Current Outpatient Medications   Medication Sig Dispense Refill    diltiazem (CARDIZEM CD) 240 MG extended release capsule Take 1 capsule by mouth daily 60 capsule 1    docusate sodium (COLACE, DULCOLAX) 100 MG CAPS Take 100 mg by mouth daily 60 capsule 0    Multiple Vitamins-Minerals (CENTRUM SILVER PO) Take 1 capsule by mouth      vitamin E 200 units capsule Take 200 Units by mouth daily      Omega-3 Fatty Acids (FISH OIL) 1000 MG CAPS Take 3,000 mg by mouth 3 times daily         Nursing Notes Reviewed    VITAL SIGNS:  ED Triage Vitals   Enc Vitals Group      BP       Pulse       Resp       Temp       Temp src       SpO2       Weight       Height       Head Circumference       Peak Flow       Pain Score       Pain Loc       Pain Edu? Excl. in [address]? PHYSICAL EXAM:  Physical Exam   Constitutional: She appears well-developed and well-nourished. She has a sickly appearance. She appears ill. No distress. HENT:   Head: Normocephalic and atraumatic. Right Ear: External ear normal.   Left Ear: External ear normal.   Mouth/Throat: Oropharynx is clear and moist. No oropharyngeal exudate. Eyes: Pupils are equal, round, and reactive to light. Conjunctivae and EOM are normal. Right eye exhibits no discharge. Left eye exhibits no discharge. No scleral icterus. Neck: Normal range of motion. No JVD present. Cardiovascular: Regular rhythm, normal heart sounds and intact distal pulses. Tachycardia present. Exam reveals no gallop and no friction rub. No murmur heard. Pulmonary/Chest: Effort normal and breath sounds normal. No stridor. No respiratory distress. She has no wheezes. She has no rales. Abdominal: Soft. Bowel sounds are normal. She exhibits distension and ascites. She exhibits no pulsatile midline mass and no mass. There is tenderness. There is no rebound and no guarding. No hernia. Musculoskeletal: She exhibits edema. She exhibits no tenderness or deformity. Neurological: She is alert. She has normal strength. She is disoriented. She displays no atrophy and no tremor. No cranial nerve deficit or sensory deficit. She exhibits normal muscle tone. She displays no seizure activity. Coordination normal. GCS eye subscore is 4. GCS verbal subscore is 1. GCS motor subscore is 5. Skin: Skin is warm. No rash noted. She is not diaphoretic. No erythema. No pallor. Nursing note and vitals reviewed.         I have reviewed andinterpreted all of the currently available lab results from this visit (if applicable):    Results for orders placed or performed during the hospital encounter of 04/21/19   Lactic Acid, Plasma   Result Value Ref Range    Lactate (HH) 0.4 - 2.0 mMOL/L     3.9  LACTIC CALLED TO JUDITH DREW  ED [id number] [facility]   RESULTS READ BACK     Blood Gas, Venous   Result Value Ref Range    pH, Chente 7.36 7.32 - 7.42    pCO2, Chente 39 38 - 52 mmHG    pO2, Chente 194 (H) 28 - 48 mmHG    Base Excess 3  MINUS   (H) 0 - 2.4    HCO3, Venous 22.0 19 - 25 MMOL/L    O2 Sat, Chente 94.9 (H) 50 - 70 %    Comment VBG    Ammonia Level   Result Value Ref Range    Ammonia 155 (H) 11 - 51 UMOL/L   POC Blood Glucose   Result Value Ref Range    Glucose 136 mg/dL   POCT Glucose   Result Value Ref Range    POC Glucose 136 (H) 70 - 99 MG/DL        Radiographs (if obtained):  [] The following radiograph was interpreted by myself in the absence of a radiologist:  [x] Radiologist's Report Reviewed:    CT brain, CXR, Ct abd/pelv    EKG (if obtained): (All EKG's are interpreted by myself in the absence of a cardiologist)    12 lead EKG per my interpretation:  Sinus Tachycardia 109  Axis is   Normal  QTc is 449  There is no specific T wave changes appreciated. There is no specific ST wave changes appreciated. Prior EKG to compare with was not available     Total critical care time today provided was at least 35 minutes. This excludes seperately billable procedure. Critical care time provided for reviewing labs, images consulting medicine giving fluids, antibiotics that required close evaluation and/or intervention with concern for patient decompensation. MDM:    Patient here with increased confusion, history of cancer, liver cirrhosis increased ammonia level. Patient on arrival is alert she is from a nursing home full code. She is not followed commands she is confused she is tachycardic and fluids antibiotics she has peripheral edema workup performed his increased ammonia level given lactulose awaiting imaging will admit patient to hospital also given broad-spectrum antibiotics for possible sepsis. Patient found to have possible large bowel obstruction, will insert ng tube, will consult surgery, will admit to hospital patient otherwise stable. CLINICAL IMPRESSION:  Final diagnoses:   Fatigue, unspecified type   Increased ammonia level   Altered mental status, unspecified altered mental status type   Elevated lactic acid level   Peripheral edema   History of cancer   Distended abdomen       (Please note that portions of this note may have been completed with a voice recognition program. Efforts were made to edit the dictations but occasionally words aremis-transcribed.)    DISPOSITION REFERRAL (if applicable):  No follow-up provider specified.     DISPOSITION MEDICATIONS (if applicable):  New Prescriptions    No medications on file          Nando Lopez, [facility], DO  04/21/19 [facility],   04/21/19 8847

## 2019-04-22 NOTE — CONSULTS
Department of General Surgery   Surgical Service Dr. Davis December   Consult Note    Date of Consult: 4/22/19    Reason for Consult:  Pt known to you; metastatic cancer; concern for large bowel obstruction  Requesting Physician:  ED - Dr. Janelle Conway:  Altered mental status    History Obtained From:  EMR, non-family caregiver - pt's ex     HISTORY OF PRESENT ILLNESS:      The patient is a 78 y.o. female who presented to the [facility] ED from her nursing facility with altered mental status. Pt was admitted to [address] [place] [address] last month (3/1/19) after being found down at home and was discharged to a nursing facility at that time to [facility] (3/10/19). Pt is currently still in altered mental state and thus limited HPI. Majority of information is from EMR review and in speaking with her ex  who is at the bedside now. The pt was evaluated by our ED -- including labs, images, and a physical exam. She was found to have multiple issues including:  -hepatic encephalopathy  -concern for large bowel obstruction  -concern for left sided pneumonia  -metastatic large cell neuroendocrine carcinoma  -cirrhosis  -severe thrombocytopenia    The pt was admitted to the ICU and a surgical consult was placed for recommendations, specifically related to her large bowel obstruction. At the time of admission, an NGT was placed.         Past Medical History:    Past Medical History:   Diagnosis Date    Anxiety     Atrial flutter ([address] 03/2019    Hypertension     Neuroendocrine cancer ([facility]        Past Surgical History:    Past Surgical History:   Procedure Laterality Date    HYSTERECTOMY         Current Medications:   Current Facility-Administered Medications   Medication Dose Route Frequency Provider Last Rate Last Dose    sodium chloride flush 0.9 % injection 10 mL  10 mL Intravenous 2 times per day Anisha Mathis MD   10 mL at 04/22/19 0935    sodium chloride flush 0.9 % injection 10 mL  10 mL Intravenous PRN Gini Hurtado MD        ondansetron (ZOFRAN) injection 4 mg  4 mg Intravenous Q6H PRN Gini Hurtado MD        ipratropium-albuterol (DUONEB) nebulizer solution 1 ampule  1 ampule Inhalation 4x daily Gini Hurtado MD        levofloxacin (LEVAQUIN) 500 MG/100ML infusion 500 mg  500 mg Intravenous Q24H Mariano Kirkpatrick MD        cefepime (MAXIPIME) 2 g in dextrose 5 % 50 mL IVPB  2 g Intravenous Q12H Mariano Kirkpatrick  mL/hr at 04/22/19 0934 2 g at 04/22/19 0934    [START ON 4/23/2019] vancomycin (VANCOCIN) 1,500 mg in dextrose 5 % 500 mL IVPB  1,500 mg Intravenous Q24H Mariano Kirkpatrick MD        lactulose enema   Rectal Once Mariano Kirkpatrick MD           Allergies:  Patient has no known allergies.     Social History:   Social History     Socioeconomic History    Marital status:      Spouse name: None    Number of children: None    Years of education: None    Highest education level: None   Occupational History    None   Social Needs    Financial resource strain: None    Food insecurity:     Worry: None     Inability: None    Transportation needs:     Medical: None     Non-medical: None   Tobacco Use    Smoking status: Never Smoker    Smokeless tobacco: Never Used   Substance and Sexual Activity    Alcohol use: No    Drug use: No    Sexual activity: Not Currently     Partners: Male   Lifestyle    Physical activity:     Days per week: None     Minutes per session: None    Stress: None   Relationships    Social connections:     Talks on phone: None     Gets together: None     Attends Zoroastrianism service: None     Active member of club or organization: None     Attends meetings of clubs or organizations: None     Relationship status: None    Intimate partner violence:     Fear of current or ex partner: None     Emotionally abused: None     Physically abused: None     Forced sexual activity: None   Other Topics Concern    None   Social History Narrative    None       Family History: Family History   Problem Relation Age of Onset    Heart Disease Mother     Other Father         Hodgkins       REVIEW OFSYSTEMS:    Review of Systems   Unable to perform ROS: Mental status change       PHYSICAL EXAM:  Vitals:    04/22/19 0730 04/22/19 0800 04/22/19 0900 04/22/19 0932   BP: 123/83 117/73 104/78    Pulse: 100 100 100 101   Resp: 18 17 18    Temp: 97.6 °F (36.4 °C)      TempSrc: Axillary      SpO2: 100% 97% 98%    Weight:       Height:           Physical Exam   Constitutional: She appears well-nourished. She appears distressed (moans). Appears ill     HENT:   Head: Normocephalic and atraumatic. +NGT in place     Eyes: Right eye exhibits no discharge. Left eye exhibits no discharge. Neck: Neck supple. No thyromegaly present. Cardiovascular:   Sinus tachy on monitor     Pulmonary/Chest: No respiratory distress. Abdominal: Soft. She exhibits distension. She exhibits no mass. There is no tenderness. There is no rebound and no guarding. No hernia. Musculoskeletal: She exhibits no tenderness or deformity. Neurological:   Unable to assess - pt currently with altered mental status     Skin: Skin is warm and dry. She is not diaphoretic. Psychiatric:   Unable to assess - pt currently with altered mental status   Vitals reviewed.         DATA:    CBC:   Lab Results   Component Value Date    WBC 8.6 04/22/2019    RBC 2.65 04/22/2019    HGB 8.4 04/22/2019    HCT 27.2 04/22/2019    .6 04/22/2019    MCH 31.7 04/22/2019    MCHC 30.9 04/22/2019    RDW 20.2 04/22/2019    PLT 29 04/22/2019     CBC with Differential:    Lab Results   Component Value Date    WBC 8.6 04/22/2019    RBC 2.65 04/22/2019    HGB 8.4 04/22/2019    HCT 27.2 04/22/2019    PLT 29 04/22/2019    .6 04/22/2019    MCH 31.7 04/22/2019    MCHC 30.9 04/22/2019    RDW 20.2 04/22/2019    NRBC 1 03/01/2019    SEGSPCT 71.0 04/22/2019    BANDSPCT 3 03/01/2019    LYMPHOPCT 24.8 04/22/2019    MONOPCT 3.6 04/22/2019    BASOPCT 0.1 04/22/2019    MONOSABS 0.3 04/22/2019    LYMPHSABS 2.1 04/22/2019    EOSABS 0.0 04/22/2019    BASOSABS 0.0 04/22/2019    DIFFTYPE AUTOMATED DIFFERENTIAL 04/22/2019     CMP:    Lab Results   Component Value Date     04/22/2019    K 4.0 04/22/2019     04/22/2019    CO2 20 04/22/2019    BUN 25 04/22/2019    CREATININE 0.9 04/22/2019    GFRAA >60 04/22/2019    LABGLOM >60 04/22/2019    GLUCOSE 140 04/22/2019    PROT 4.6 04/22/2019    LABALBU 1.6 04/22/2019    CALCIUM 7.1 04/22/2019    BILITOT 1.3 04/22/2019    ALKPHOS 278 04/22/2019    AST 28 04/22/2019    ALT 20 04/22/2019     BMP:    Lab Results   Component Value Date     04/22/2019    K 4.0 04/22/2019     04/22/2019    CO2 20 04/22/2019    BUN 25 04/22/2019    LABALBU 1.6 04/22/2019    CREATININE 0.9 04/22/2019    CALCIUM 7.1 04/22/2019    GFRAA >60 04/22/2019    LABGLOM >60 04/22/2019    GLUCOSE 140 04/22/2019     Magnesium:    Lab Results   Component Value Date    MG 1.9 04/22/2019     Phosphorus:    Lab Results   Component Value Date    PHOS 3.3 04/22/2019     PT/INR:    Lab Results   Component Value Date    PROTIME 17.8 04/21/2019    INR 1.54 04/21/2019     PTT:    Lab Results   Component Value Date    APTT 30.0 04/21/2019   [APTT}  Troponin:  No results found for: TROPONINI  U/A:    Lab Results   Component Value Date    COLORU JACOBY 04/21/2019    PROTEINU 30 04/21/2019    WBCUA 2 04/21/2019    RBCUA 1 04/21/2019    MUCUS RARE 04/21/2019    TRICHOMONAS NONE SEEN 04/21/2019    BACTERIA MODERATE 04/21/2019    CLARITYU CLEAR 04/21/2019    SPECGRAV 1.023 04/21/2019    LEUKOCYTESUR NEGATIVE 04/21/2019    UROBILINOGEN 2.0 04/21/2019    BILIRUBINUR MODERATE 04/21/2019    BLOODU NEGATIVE 04/21/2019     ABG:    Lab Results   Component Value Date    BE 3  MINUS   04/21/2019     VBG:    Lab Results   Component Value Date    PHVEN 7.36 04/21/2019    FIX0EKA 39 04/21/2019    I7LMAQHQ 94.9 04/21/2019     LIPASE:    Lab Results   Component Value Date chemotherapy. F/u  on Heme/Onc recs. Pt to benefit from Palliate Care c/s. Spoke with Dr. Alvarado Olmos this morning and he will see the pt. -Pneumonia   Pt is currently being treated by primary team. Continue current ABx. Pharmacy  is currently dosing.     -Code status   Currently pt's code status is full code. Pt has terminal diagnosis and has  decompensated significantly in the last several months. She does not appear  to have any sort of advanced directives, living will, or next of kin. The pt is  ; however, she still keeps in touch with her ex . He was  visiting her today. She has no children. Her parents are both . The pt  reportedly has two siblings, but live out of state in [place]. The pt's ex   tells me that neither of the siblings are in condition to meaningfully  contribute to code status/POA discussions. The pt reportedly has a niece who  lives in [address] [place] [address]. She was reportedly visiting earlier today. Her name  is Aniceto Hodgkins and her cell number is [phone]. Serious consideration should be  had regarding changing the pt's current code status.    -Large bowel obstruction   Likely related to the pt's diffuse metastatic disease (specifically rectal  mass/thickening). Could consider doing a diverting ostomy; however pt is also  currently significantly thrombocytopenic and has pneumonia. D/w the pt's ex   risks of taking pt to surgery. He states adamantly that pt would not  want to be on a ventilator. Will discuss with pt's niece for further guidance for  possible laparoscopic diverting ostomy.           Benja Ortiz MD

## 2019-04-22 NOTE — CARE COORDINATION
Cm in to see pt. No family present. Pt is unresponsive. Hospice consult order received. 1 Cm contacted pt's niece, Jaydon Garg and discussed code status and hospice referral. Niece would like to see code status changed. CM discussed this with charge RN, Margy Favre, who will sent perfect serve message to Dr Carli Quiñonez requesting that he contact niece today to discuss code status. Cm discussed hospice referral and offered options. Niece would like to proceed with referral to [facility]. Cm contacted [facility] and provided referral info. [facility] will arrange a time to meet with Jaydon Fanti. Cm to follow.

## 2019-04-22 NOTE — PROGRESS NOTES
Rona Witt is a 78 y.o. female patient opens eyes at times but very drowsy    Current Facility-Administered Medications   Medication Dose Route Frequency Provider Last Rate Last Dose    sodium chloride flush 0.9 % injection 10 mL  10 mL Intravenous 2 times per day Josefina Washington MD        sodium chloride flush 0.9 % injection 10 mL  10 mL Intravenous PRN Josefina Washington MD        ondansetron (ZOFRAN) injection 4 mg  4 mg Intravenous Q6H PRN Josefina Washington MD        ipratropium-albuterol (DUONEB) nebulizer solution 1 ampule  1 ampule Inhalation 4x daily Josefina Washington MD        cefTRIAXone (ROCEPHIN) 2 g in dextrose 5 % 50 mL IVPB  2 g Intravenous Q24H Josefina Washington MD   Stopped at 04/22/19 0246     No Known Allergies  Active Problems:    Hepatic encephalopathy ([address]  Resolved Problems:    * No resolved hospital problems. *    Blood pressure 123/83, pulse 100, temperature 97.6 °F (36.4 °C), temperature source Axillary, resp. rate 18, height 5' 3\" (1.6 m), weight 175 lb 8 oz (79.6 kg), SpO2 100 %. Subjective:  Symptoms:  Worsening. Diet:  NPO. Objective:  General Appearance:  Comfortable. Vital signs: (most recent): Blood pressure 116/81, pulse 101, temperature 98 °F (36.7 °C), temperature source Oral, resp. rate 16, height 5' 3\" (1.6 m), weight 175 lb 8 oz (79.6 kg), SpO2 100 %. (Tachy). HEENT: Normal HEENT exam.  (NG tube, dilated pupils)    Lungs: There are decreased breath sounds. Heart: Tachycardia. Abdomen: Abdomen is soft and distended. (Moves upon palpating abd). Hypoactive bowel sounds. Extremities: Decreased range of motion. There is dependent edema. Neurological: (Lethargic and nonverbal at bedside). Pupils:  Pupils are equal. (Dilated).       Assessment & Plan  Hepatic encephalopathy  -ammonia higher   - lactulose enema  -CT head neg  Severe thrombocytopenia  -from liver mets and sepsis  Bowel obstruction  -CT with large bowel distended and LBO from mass?  -surg

## 2019-04-22 NOTE — H&P
History and Physical      Name:  Idania Wright /Age/Sex: 1950  (78 y.o. female)   MRN & CSN:  [id number] & [id number] Admission Date/Time: 2019  9:08 PM   Location:   PCP: Ruddy Cano MD       Hospital Day: 2    Assessment and Plan:   Idania Wright is a 78 y.o.  female  who presents with altered mental status    Hepatic encephalopathy  Large bowel obstruction secondary to colon mass  Lactic acidosis, possible Sepsis ? SBP vs Bowel ischemia due to obstruction  Metastatic colon cancer with mets to liver and lung  Liver cirrhosis  Severe thrombocytopenia  Ammonia 155  Abdomen distended and has ascites on exam  Toxic looking  Lethargic  Vitally stable so far and maintaining her airway  Admit to ICU  Start Rocephin   NGT to suction  Will not be able to give lactulose in setting of bowel obstruction  General surgery eval for possible diversion colostomy for palliation  Hospice consult  Repeat labs in am  Caution with IVF  Monitor I/O    Diet Diet NPO Effective Now   DVT Prophylaxis [] Lovenox, []  Heparin, [x] SCDs, [] No VTE prophylaxis, patient ambulating   GI Prophylaxis [] PPI, [] H2 Blocker, [x] No GI prophylaxis, patient is receiving diet/Tube Feeds   Code Status Full Code             History of Present Illness:     Chief Complaint: Lethargy   Idania Wright is a 78 y.o.  female  With Hx of metastatic colon cancer, who presents from SNF for altered mental status, she was found very lethargic, moaning but not interacting or talking, her friend was visiting her in SNF and didn't like how she looked so she asked SNF to transfer patient to Hospital, Patient was noted to be lethargic, awakens to tactile stimulation, her ammonia level was elevated. He work up showed that she has colon obstruction cause by her colon mass.  She is full code, has no children and is , [place] [facility] and 1 niece in [place], no POA, no advance directives, her friend wants her to have the surgery if that will make her more comfortable. Ten point ROS reviewed negative, unless as noted above    Objective:   No intake or output data in the 24 hours ending 04/22/19 0123   Vitals:   Vitals:    04/22/19 0100   BP: 124/87   Pulse: 112   Resp: 16   Temp:    SpO2:      Physical Exam:    GEN Lethargic female, toxic looking, moaning. EYES Pupils are equally round. Pale conjunctiva   HENT Mucous membranes are moist.   NECK No apparent thyromegaly or masses. RESP Clear to auscultation, no wheezes, rales or rhonchi. Symmetric chest movement while on room air. CARDIO/VASC S1/S2 auscultated. Tachycardic without appreciable murmurs, rubs, or gallops. . +3 peripheral edema. GI Abdomen is soft without significant tenderness, masses, or guarding. Bowel sounds are normoactive. SKIN pale  NEURO Lethargic, moans with tactile stimulation, opens eyes to name  moving all extremities      Past Medical History:      Past Medical History:   Diagnosis Date    Anxiety     Atrial flutter ([facility] 03/2019    Hypertension     Neuroendocrine cancer ([facility] [facility]      PSHX:  has a past surgical history that includes Hysterectomy. Allergies: No Known Allergies    FAM HX: family history includes Heart Disease in her mother; Other in her father.   Soc HX:   Social History     Socioeconomic History    Marital status:      Spouse name: None    Number of children: None    Years of education: None    Highest education level: None   Occupational History    None   Social Needs    Financial resource strain: None    Food insecurity:     Worry: None     Inability: None    Transportation needs:     Medical: None     Non-medical: None   Tobacco Use    Smoking status: Never Smoker    Smokeless tobacco: Never Used   Substance and Sexual Activity    Alcohol use: No    Drug use: No    Sexual activity: Not Currently     Partners: Male   Lifestyle    Physical activity:     Days per week: None     Minutes per

## 2019-04-22 NOTE — ED NOTES
PAGED DR Ron Bernard FOR DR KELLY [facility] AT 11:01 PM     Itzel Arevalo  04/21/19 9680

## 2019-04-22 NOTE — ED NOTES
Bed: 02TR-02  Expected date:   Expected time:   Means of arrival:   Comments:  EMS- [facility] Resident, 78 F lethargic, VSS, full code     Latasha Paredes, APPLE  04/21/19 2110

## 2019-04-22 NOTE — ED NOTES
Patient being admitted to room 2124. Report called to APPLE Meeks.       Adam Romo RN  04/22/19 9698

## 2019-04-22 NOTE — ED TRIAGE NOTES
Patient brought to ED per Q[facility]. Per nursing home report patient with a dx of liver and jaw cancer. Per nursing home, patient sent to ED today for complaints of increased lethargy and increased ammonia levels. Patient only responsive to verbal stimuli on arrival, yet does not converse. Report received from nursing home staff anf [facility] EMTs.

## 2019-04-22 NOTE — CONSULTS
Palliative Medicine Consultation    Reason for Consult:      __X__ Advance Care Planning--Full Code; recommend DNR-CC-A at most  __X__Transition of Care Planning--?to hospice? __X__ Psychosocial Support  __X__ Symptom Management--pain    Recommendations:    1. Continue excellent medical management of this unfortunate woman with encephalopathy from liver cancer-induced liver failure and high ammonia  2. Will start low dose morphine infusion for comfort; may have PRN morphine for breakthrough  3. Would recommend DNR-CC-A at most    Requesting Physician:  Dr. Raheem Mccoy:  Altered mental status, confusion, pain    History Obtained From:  non-family caregiver - her dear friend of 36 years, electronic medical record    HISTORY OF PRESENT ILLNESS:    Mrs. Bryan Almonte was diagnosed with liver cancer late last year; she started chemo-therapy. Per her friend, she sought second opinion from [facility]; reportedly, they discouraged her from pursuing care here, and instead advised her to return in May. She has continued to decline. She's been in ECF; when her friend visited last night and saw what poor condition she was in, she insisted on an ED visit. She was admitted with high ammonia level, confusion, bowel obstruction, altered mental status. She will likely engage hospice services soon. I was asked to see her to assist with symptom management (pain).               Past Medical History:        Diagnosis Date    Anxiety     Atrial flutter ([facility] 03/2019    Hypertension     Neuroendocrine cancer ([facility]        Past Surgical History:        Procedure Laterality Date    HYSTERECTOMY         Current Medications:    Current Facility-Administered Medications: sodium chloride flush 0.9 % injection 10 mL, 10 mL, Intravenous, 2 times per day  sodium chloride flush 0.9 % injection 10 mL, 10 mL, Intravenous, PRN  ondansetron (ZOFRAN) injection 4 mg, 4 mg, Intravenous, Q6H PRN  ipratropium-albuterol (DUONEB) nebulizer solution 1 ampule, 1 ampule, Inhalation, 4x daily  levofloxacin (LEVAQUIN) 500 MG/100ML infusion 500 mg, 500 mg, Intravenous, Q24H  cefepime (MAXIPIME) 2 g in dextrose 5 % 50 mL IVPB, 2 g, Intravenous, Q12H  [START ON 4/23/2019] vancomycin (VANCOCIN) 1,500 mg in dextrose 5 % 500 mL IVPB, 1,500 mg, Intravenous, Q24H  [START ON 4/23/2019] lactulose enema, , Rectal, Once  0.9 % sodium chloride infusion, , Intravenous, Continuous  morphine sulfate (PF) injection 1 mg, 1 mg, Intravenous, Q3H PRN  morphine PCA 1 mg/mL, , Intravenous, Continuous    Allergies:  Patient has no known allergies. Social History:     30 years, never smoker, non-drinker, no children    Family History:       Problem Relation Age of Onset    Heart Disease Mother     Other Father         Hodgkins       REVIEW OF SYSTEMS:    Review of systems not obtained due to patient factors - mental status    Vitals:    /83   Pulse 117   Temp 97.6 °F (36.4 °C) (Rectal)   Resp 17   Ht 5' 3\" (1.6 m)   Wt 175 lb 8 oz (79.6 kg)   SpO2 100%   BMI 31.09 kg/m²     PHYSICAL EXAM:    I met Mrs. Jacobs in her ICU room; her good friend came in when I was examining her. Gen:  WD thin, frail, pale appearing older white woman, moaning in pain  HEENT:  Normocephalic, dry MM  Neck:  No nodes  Heart:  RRR  Lungs:  CTA in front  Abdomen:  Soft  Extremities:  No edema; marked muscle wasting    DATA:    Reviewed, addressed elsewhere in chart; albumin is 1.6  IMPRESSION:    1. Unfortunate older woman with advanced liver cancer, admitted for altered mental status, pain  2. I will start low dose morphine infusion for comfort  3. Hospice has been consulted as well; I believe her prognosis is hours to days. I spoke with patient's friend at length  If I can help in any other way, please let me know    I spent 35 minutes, >50% in counseling and coordinating care.     Dieter Dodson D.O., [facility], Palliative Care

## 2019-04-23 NOTE — PROGRESS NOTES
Palliative Care  I see patient for symptom management and goals of care related to metastatic liver cancer (?started in lung?). Her ex- and niece are visiting. She is moaning and does not really respond; only briefly maintains eye contact. Heart: RRR, Lungs: CTA in front, non-labored respirations. Very pale, cachectic. Morphine at 0.5 mg/hr; will increase to 1 mg/hr. Will change to 1 or 2 mg IV morphine Q3H PRN. She looks close to end-of-life. Code status has been changed to [facility]. ? Transition to [facility] today? Patient was seen with total face to face time of 15 minutes. More than 50% of this visit was counseling and education regarding palliative care as well as counseling on preventative health maintenance follow-up.   Electronically signed by Akilah Francisco DO on 4/23/2019 at 9:25 AM

## 2019-04-23 NOTE — PROGRESS NOTES
[address] [facility] hematology and oncology associate [facility]    REASON FOR CONSULT  To evaluate the patient with metastatic poorly differentiated neuroendocrine carcinoma. CHIEF COMPLAINT    Chief Complaint   Patient presents with    Fatigue     per  x3 days    Other     High ammonia levels reported today per nursing home        HISTORY OF PRESENT ILLNESS   Ms. Christophe Malhotra is a 28-year-old very pleasant patient with medical history significant for hypertension, anxiety disorder, type 2 diabetes mellitus, initially referred to me on August 8, 2018 for evaluation of poorly differentiated carcinoma. She stated that she started following up with her dentist in [place] for left lower loose tooth underneath teeth bridge, since March 2018. She was treated with antibiotics, root canal therapy etc., however, it was not healing up properly. Her tooth was getting looser and she was subsequently referred to oral surgeon, Dr. Norma Crowell. She was subsequently evaluated by Dr. Norma Crowell and she underwent extraction of her left lower tooth on July 31, 2018. During the procedure, Dr. Nolan Manley does not like the appearance of the area (jaw and gum) and he took biopsy from the area. Final pathology (results from [facility]) was consistent with poorly differentiated carcinoma. Immunohistochemical probe analysis revealed that the lesional cells to be positive for expression of AE1/3 and TTF-1, while essentially negative for expression of CK 20, CK 7, MAHAMED-3, p63, Napsin and PAX-8. The microscopic features and IHC findings would be most compatible with metastatic disease from primary lung malignancy. Pathologist recommend appropriate imaging study to evaluate this possibility. She denies chest pain, hemoptysis, shortness of breath, loss of appetite, weight loss and lymphadenopathy.      PET CT scan done on August 23, 2018 showed lytic left mandibular lesion is intensely hypermetabolic and consistent with recently diagnosed malignancy. There may be a subtle hypermetabolic left level 2 cervical lymph node which could be metastatic. Diffuse hepatic metastatic disease. Intense focal uptake within the colon near the hepatic flexure is suspicious for malignancy. A distal rectal mass measuring 3.8 x.4 cm is intensely hypermetabolic and also suspicious for malignancy. Please correlate with colonoscopy. Small pulmonary nodules demonstrate no abnormal uptake but are likely below the threshold for evaluation by PET. A tubular opacity in the right upper lobe could represent mucoid impaction. These findings can be re-evaluated on follow-up. She has laboratory workups on August 8, 2018 and all other workups including , CA-27-29, alpha-fetoprotein, CA-19-9, CEA and LDH level will within normal range. She underwent colonoscopy by DR. Gary and there was no apparent lesions in her rectum. She was found to have large polyps in hepatic flexure, which couldn't be removed due to thick pedicle. She has MRI of the pelvis on September 7, 2018 and it showed circumferential thickening of the anal canal. The inferior rectum just proximal to this shows milder mural thickening. No focal rectal mass is shown. No perirectal lymphadenopathy is seen. No abnormal thickening of the mesorectal fascia is identified. The T2 hypointense muscularis propria of the rectum appears intact. Within the limited field of view, marrow lesions are evident within the sacrum and concerning for metastasis. She was seen by Dr. Jim Laurent at [facility]. Upon reviewed of her pathology again in 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 I discussed her case with Dr. Irina Troy over the phone and he recomemnds to initiate first line chemotherapy with carboplatin and etoposide.     Since she was found to have extensive stage high grade large cell neuroendocrine carcinoma, first line chemotherapy with carboplatin and etoposide was started on October 23, 2018 and she finished her fourth cycle on 12/28/18. CT scan of the soft tissue neck, chest, abdomen and pelvis done on January 9, 2019 showed stable destructive expansile soft tissue lesion involving the left mandibular body consistent with malignancy. Heterogeneous multinodular enlargement of the right thyroid lobe. Redemonstration of metastatic disease to the liver, grossly unchanged given differences in technique from prior whole body PET-CT. Irregular asymmetric wall thickening involving portions of rectum correlating with thickening and FDG avidity noted on prior PET-CT. Neoplastic process of concern. There is also an area of apparent wall thickening and narrowing involving hepatic flexure of the large bowel which demonstrated FDG avidity on prior PET-CT as well. Neoplastic process in this region of concern as well. She has stable disease after 4 cycles of chemotherapy, decision was made to follow her closely and to start chemo again when she has progression of the disease. On April 21, 2019, she presented to [facility] with altered mental status. She was found to be very lethargic, moaning and she is not interacting or talking. She was found to have hepatic encephalopathy and bowel obstruction. After discussion with her niece, her code status was changed to DNR CC. I was called to evaluate her. She is still moaning and lack of interaction. She looks very cachectic than before.      PAST MEDICAL HISTORY    Past Medical History:   Diagnosis Date    Anxiety     Atrial flutter ([address] 03/2019    Hypertension     Neuroendocrine cancer ([facility] [address]        SURGICAL HISTORY    Past Surgical History:   Procedure Laterality Date    HYSTERECTOMY         FAMILY HISTORY    Family History   Problem Relation Age of Onset    Heart Disease Mother     Other Father         Hodgkins       SOCIAL HISTORY    Social History     Socioeconomic History    Marital status:      Spouse name: None    Number of children: None    Years of education: None    Highest education level: None   Occupational History    None   Social Needs    Financial resource strain: None    Food insecurity:     Worry: None     Inability: None    Transportation needs:     Medical: None     Non-medical: None   Tobacco Use    Smoking status: Never Smoker    Smokeless tobacco: Never Used   Substance and Sexual Activity    Alcohol use: No    Drug use: No    Sexual activity: Not Currently     Partners: Male   Lifestyle    Physical activity:     Days per week: None     Minutes per session: None    Stress: None   Relationships    Social connections:     Talks on phone: None     Gets together: None     Attends Buddhist service: None     Active member of club or organization: None     Attends meetings of clubs or organizations: None     Relationship status: None    Intimate partner violence:     Fear of current or ex partner: None     Emotionally abused: None     Physically abused: None     Forced sexual activity: None   Other Topics Concern    None   Social History Narrative    None     PHYSICAL EXAM    Vitals: /71   Pulse 112   Temp 97.3 °F (36.3 °C) (Axillary)   Resp 17   Ht 5' 3\" (1.6 m)   Wt 175 lb 8 oz (79.6 kg)   SpO2 98%   BMI 31.09 kg/m²   General appearance: Lethargic and moaning. Unable to interact with her. Very cachectic. Skin: Skin color, texture, turgor normal. No rashes or lesions  HEENT: Head: Normocephalic, no lesions, without obvious abnormality.   Neck: no adenopathy, no carotid bruit, no JVD, supple, symmetrical, trachea midline and thyroid not enlarged, symmetric, no tenderness/mass/nodules  Lungs: clear to auscultation bilaterally  Heart: regular rate and rhythm, S1, S2 normal, no murmur, click, rub or gallop  Abdomen: soft, non-tender; bowel sounds normal; no masses,  no organomegaly  Extremities: extremities normal, atraumatic, no cyanosis, Edema +++  Neurologic: Lethargic, moan with tactile stimulation, unable to interact  LABORATORY RESULTS  CBC:   Recent Labs     04/21/19  0900 04/21/19 2248 04/22/19  0510   WBC 11.0* 10.5 8.6   HGB 9.0* 9.6* 8.4*   PLT 39* 33* 29*     BMP:    Recent Labs     04/21/19 0900 04/21/19 2136 04/21/19 2248 04/22/19  0510     --  141 138   K 4.2  --  4.3 4.0     --  106 108   CO2 25  --  22 20*   BUN 22  --  26* 25*   CREATININE 0.7  --  1.0 0.9   GLUCOSE 81 136 137* 140*     Hepatic:   Recent Labs     04/21/19 0900 04/21/19 2248 04/22/19  0510   AST 29 31 28   ALT 20 22 20   BILITOT 1.4* 1.6* 1.3*   ALKPHOS 304* 323* 278*     INR:   Recent Labs     04/21/19 2248   INR 1.54     ASSESSMENT  1. Metastatic high grade neuroendocrine carcinoma  2. Hepatic encephalopathy secondary to multiple liver metastasis  3. Large bowel obstruction    RECOMMENDATION  I discussed with her  regarding her current conditions. I don't think she is a candidate for any kind of procedure now and her prognosis is poor. I don't expect she will recover from current conditions. I recommend palliative care and inpatient hospice. Thank you for allowing me to participate in the care of your patient.

## 2019-04-23 NOTE — PROGRESS NOTES
General Surgery-Dr. Trista Levine Day: 3    Chief Complaint on Admission: altered mental status      Subjective:     Pt seen by Palliate Care, Oncology. Continues to remain altered and non-interactive. Per chart review and in talking to nurse, pt code status switched to [facility] and hospice c/s placed. ROS:  Review of Systems    Allergies  Patient has no known allergies. Diagnosis Date    Anxiety     Atrial flutter ([facility] [address] 2019    Hypertension     Neuroendocrine cancer ([facility] [address]        Objective:     Vitals:    19 1430   BP: (!) 90/57   Pulse: 107   Resp: 14   Temp:    SpO2: 96%       TEMPERATURE:  Current -Temp: 97.3 °F (36.3 °C); Max - Temp  Av.6 °F (36.4 °C)  Min: 97.3 °F (36.3 °C)  Max: 98 °F (36.7 °C)    No intake/output data recorded. I/O last 3 completed shifts: In: 1558.6 [I.V.:58.6; IV Piggyback:1500]  Out: 315 [Urine:300; Emesis/NG output:15]      Physical Exam:  Physical Exam  Moaning, non-interactive. Opens eyes at times. Does not follow commands. AT. NC. +NGT.  RRR. Non-labored.   S, + distended, no PS  Dry  Pale and cachectic     Scheduled Meds:   sodium chloride flush  10 mL Intravenous 2 times per day    ipratropium-albuterol  1 ampule Inhalation 4x daily    levofloxacin  500 mg Intravenous Q24H    cefepime  2 g Intravenous Q12H    vancomycin  1,500 mg Intravenous Q24H    lactulose enema   Rectal Once     ContinuousInfusions:   sodium chloride 10 mL/hr at 19 1744    morphine 0.5 mL/hr at 19 2100     PRN Meds:morphine, morphine, sodium chloride flush, ondansetron      Labs/Imaging Results:   Lab Results   Component Value Date    WBC 12.1 (H) 2019    HGB 8.4 (L) 2019    HCT 29.6 (L) 2019    .5 (H) 2019    PLT 23 (L) 2019     Lab Results   Component Value Date     2019    K (LL) 2019     3.0   K CALLED TO CASSY SALEEM RN AT 1302 ON [id number] BY  MT   RESULTS READ BACK       (H) 2019 CO2 17 (L) 04/23/2019    BUN 33 (H) 04/23/2019    CREATININE 1.2 (H) 04/23/2019    GLUCOSE 157 (H) 04/23/2019    CALCIUM 7.5 (L) 04/23/2019    PROT 4.8 (L) 04/23/2019    LABALBU 1.5 (L) 04/23/2019    BILITOT 1.3 (H) 04/23/2019    ALKPHOS 246 (H) 04/23/2019    AST 31 04/23/2019    ALT 19 04/23/2019    LABGLOM 44 (L) 04/23/2019    GFRAA 54 (L) 04/23/2019       Assessment:     79 y/o F with metastatic high grade neuroendocrine carcinoma, hepatic encephalopathy, large bowel obstruction    Plan:     -Agree with code status change.  -Agree with transfer to Hospice.  -Family does not want any procedure, and she is not presently a candidate for diverting ostomy. Likely would not tolerate.   -Plan discussed with pt's nurse and pt's ex . Will be available to operate if family changes mind and wants diverting ostomy; however, it is thought that she is currently end of life. -Please call with questions or concerns.       Electronically signed by Rahat Hamilton II, MD on 4/23/2019 at 3:39 PM

## 2019-04-23 NOTE — PROGRESS NOTES
1900  Pt transferred to room 4105 via bed. No discomfort or distress noted. MS PCA dose 1mg/hr continuous infusion confirmed with Marimar Ash, Jarad Luciano RN. Handoff skin assess: no changes noted. Pt belongings of pink socks, green pamauricios taken to room 4105 as well. Family notified prior. Handoff questions answered with Elma Fine.

## 2019-04-23 NOTE — PROGRESS NOTES
[address] [facility]  consulted by Dr. She Mei for monitoring and adjustment. Indication for treatment: Pneumonia, UTI  Goal trough: 15-20 mcg/mL     Pertinent Laboratory Values:   Temp Readings from Last 3 Encounters:   04/23/19 97.5 °F (36.4 °C) (Axillary)   03/10/19 98.3 °F (36.8 °C) (Oral)   10/02/18 97.9 °F (36.6 °C) (Oral)     Recent Labs     04/21/19 2157 04/21/19 2248 04/22/19  0510 04/23/19  1202   WBC  --  10.5 8.6 12.1*   LACTATE 3.9  LACTIC CALLED TO JUDITH DREW [facility] [id number] [address]   RESULTS READ BACK  *  --  2.3*  --      Recent Labs     04/21/19 2248 04/22/19  0510 04/23/19  1202   BUN 26* 25* 33*   CREATININE 1.0 0.9 1.2*     Estimated Creatinine Clearance: 44 mL/min (A) (based on SCr of 1.2 mg/dL (H)). Intake/Output Summary (Last 24 hours) at 4/23/2019 1641  Last data filed at 4/23/2019 1600  Gross per 24 hour   Intake 1558.63 ml   Output 180 ml   Net 1378.63 ml       Pertinent Cultures:  Date    Source    Results  4/20   Urine    Ecoli  4/21   Urine    NGTD  4/21   Blood    NGTD  4/22   Viral PCR   NGTD    Vancomycin level:   TROUGH:  No results for input(s): VANCOTROUGH in the last 72 hours. RANDOM:  No results for input(s): VANCORANDOM in the last 72 hours. Assessment:  WBC and temperature: WBC increased. Patient is afebrile. SCr, BUN, and urine output: Scr with increase, 0.9 -> 1.2, low UOP  Day(s) of therapy: #2  Vancomycin level: To be collected    Plan:  Given renal trends, plan to modify dosing from scheduled to intermittent based on levels. A random vancomycin level is ordered: 04-24-19 @ 0600 (~24 hours post last dose). Additional doses will be ordered as appropriate. MRSA nasal screen has been ordered to aid in vancomycin de-escalation for pneumonia treatment. Pharmacy will continue to monitor patient and adjust therapy as indicated.     Thank you for the consult,    Shelby Rasmussen, PharmD, [facility]

## 2019-04-23 NOTE — DISCHARGE SUMMARY
Discharge Summary    Name:  Trey aCstañeda /Age/Sex: 1950  (78 y.o. female)   MRN & CSN:  [id number] & [id number] Admission Date/Time: 2019  9:08 PM   Attending:  Yeny Caraballo MD Discharging Physician: Yeny Caraballo MD     Hospital Course:     Discharge diagnoses    Hepatic encephalopathy  Severe thrombus noted. Bowel obstruction  Colon cancer with metastases to the liver and lungs  Microcytic anemia  Suspected gram-negative left upper lobe pneumonia  Hypocalcemia    62-year-old female with a history of metastatic colon cancer presented from a skilled nursing facility on account of altered mental status. On presentation she was found to be very lethargic, moaning but nonverbal.  On presentation she was found to have elevated ammonia. Initial imaging was also suggestive of colon obstruction caused by a colonic mass. Patient had no family members at the time of admission, had no PO or advised directives. Initially the plan was to go for palliative surgery however patient's overall condition and prognosis was very poor and the plan was made to make her comfort measures only. Patient was seen by palliative care. At the time of seeing patient, she was laying in bed nonresponsive. Patient discharged to hospice care  The patient expressed appropriate understanding of and agreement with the discharge recommendations, medications, and plan. Consults this admission:  IP CONSULT TO HOSPITALIST  IP CONSULT TO GENERAL SURGERY  IP CONSULT TO HOSPICE  IP CONSULT TO PHARMACY  IP CONSULT TO ONCOLOGY  IP CONSULT TO CASE MANAGEMENT      Disposition: Discharged to:   []Home, []C, []SNF, []Acute Rehab, [x]Hospice   Condition on discharge: Stable    Discharge Medications:      Latia Hidalgo Medication Instructions B:[id number]    Printed on:19 1513   Medication Information                      diazepam (VALIUM) 5 MG tablet  Take 5 mg by mouth 2 times daily as needed for Anxiety. diltiazem (CARDIZEM CD) 240 MG extended release capsule  Take 1 capsule by mouth daily             mirtazapine (REMERON) 15 MG tablet  Take 15 mg by mouth nightly             Multiple Vitamins-Minerals (CENTRUM SILVER PO)  Take 1 capsule by mouth             Omega-3 Fatty Acids (FISH OIL) 1000 MG CAPS  Take 3,000 mg by mouth 3 times daily             potassium chloride (MICRO-K) 10 MEQ extended release capsule  Take 10 mEq by mouth daily             vitamin E 200 units capsule  Take 200 Units by mouth daily                 Objective Findings at Discharge:   BP (!) 90/57   Pulse 107   Temp 97.3 °F (36.3 °C) (Axillary)   Resp 14   Ht 5' 3\" (1.6 m)   Wt 175 lb 8 oz (79.6 kg)   SpO2 96%   BMI 31.09 kg/m²            PHYSICAL EXAM   GEN elderly female lying in bed unresponsive  EYES Pupils are equally round. No scleral erythema, discharge, or conjunctivitis. HENT Mucous membranes are dry. Oral pharynx without exudates, no evidence of thrush. NECK Supple, no apparent thyromegaly or masses. RESP Clear to auscultation, no wheezes, rales or rhonchi. Symmetric chest movement while on room air. CARDIO/VASC S1/S2 auscultated. Regular rate without appreciable murmurs, rubs, or gallops. No JVD or carotid bruits. Peripheral pulses equal bilaterally and palpable. No peripheral edema. GI Abdomen is soft without significant tenderness, masses, or guarding. Bowel sounds are normoactive. Rectal exam deferred. MSK No gross joint deformities.   NEURO unresponsive      BMP/CBC  Recent Labs     04/21/19  2248 04/22/19  0510 04/23/19  1202    138 145   K 4.3 4.0 3.0   K CALLED TO CASSY SALEEM RN AT 1302 ON [id number] BY [facility]   RESULTS READ BACK  *    108 114*   CO2 22 20* 17*   BUN 26* 25* 33*   CREATININE 1.0 0.9 1.2*   WBC 10.5 8.6 12.1*   HCT 30.9* 27.2* 29.6*   PLT 33* 29* 23*         Discharge Time of 35 minutes    Electronically signed by Delia Rider MD on 4/23/2019 at 3:19 PM

## 2019-04-24 NOTE — PROGRESS NOTES
[address] notified of patient passing. Unsure of what  home she would like. Number given to Nursing Supervisor to call when one is chosen.

## 2019-04-24 NOTE — H&P
available    Social History:   Social History     Socioeconomic History    Marital status:      Spouse name: Not on file    Number of children: Not on file    Years of education: Not on file    Highest education level: Not on file   Occupational History    Not on file   Social Needs    Financial resource strain: Not on file    Food insecurity:     Worry: Not on file     Inability: Not on file    Transportation needs:     Medical: Not on file     Non-medical: Not on file   Tobacco Use    Smoking status: Never Smoker    Smokeless tobacco: Never Used   Substance and Sexual Activity    Alcohol use: No    Drug use: No    Sexual activity: Not Currently     Partners: Male   Lifestyle    Physical activity:     Days per week: Not on file     Minutes per session: Not on file    Stress: Not on file   Relationships    Social connections:     Talks on phone: Not on file     Gets together: Not on file     Attends Quaker service: Not on file     Active member of club or organization: Not on file     Attends meetings of clubs or organizations: Not on file     Relationship status: Not on file    Intimate partner violence:     Fear of current or ex partner: Not on file     Emotionally abused: Not on file     Physically abused: Not on file     Forced sexual activity: Not on file   Other Topics Concern    Not on file   Social History Narrative    Not on file       Family History: family history includes Heart Disease in her mother; Other in her father. Old Records:  From the [facility] acute care stay have been reviewed.      Advanced Directives:  DNR-comfort care    No Known Allergies    Medications - list of home medications reviewed       ROS: As noted in [address], all other systems are unobtainable due to the patient's clinical condition,systems are negative or as follows:  Constitutional: No fever, chills, + decreased appetite, + weight loss  HEENT:  No acute visual changes, not swallowing ,   CV:  No chest pain, palpitations, CASTRO, PND  PULM:  No cough, shortness of breath, hemoptysis  GI:  +nausea, + abdominal pain     G-U No dysuria, hematuria   Musculoskeletal:  + edema,   Neuro: No seizures, syncope,   Skin:  No rash    Physical Exam:   BP (!) 70/50   Pulse 103   Temp 97.5 °F (36.4 °C) (Axillary)   Resp 16   General: Comfortable,  +obtunded, in no distress  HEENT: Mucous membranes are dry, sclerae are clear   Heart: distant tones, IRRR, S1S2, no murmurs  Lungs:  Distant, shallow breath sounds bilaterally, diminished at the bases, with rales, scattered and rhonchi   Abdomen: distended and tympanitic, bowel sounds absent, + tenderness,  Extremities: + cyanosis,  +edema  Neurologic: +obtunded, no focal motor deficit      Data: reviewed    Assesment/Plan:    1. 79 yo female with metastatic colon cancer  2. Recent onset of bowel obstruction related to tumor in colon  3. No disease modifying options for care  4. Hepatic encephalopathy  5. Severe thrombocytopenia  6. Cancer cachexia and debilitation  7. Acute on chronic renal failure  8. Unable to take any nourishment  9. Family requesting palliative care for EOL  10. [facility] in place  6. Patient with needs for management of pain, dyspnea and agitation  12. The patient is admitted to [facility] for acute management of pain, nausea, end of life care , encephalopathy  13.  DVT prophylaxis: none indicated due to Hospice/end of life care    Patient Active Problem List   Diagnosis Code    Acute renal failure (ARF) ([facility] [address] N17.9    Severe malnutrition ([address] E43    SVT (supraventricular tachycardia) (HCC) I47.1    Hepatic encephalopathy ([address] K72.90    Fatigue R53.83    History of cancer Z85.9          Electronically signed by Elza Anaya DO on 4/24/2019 at 12:34 AM

## 2019-04-25 NOTE — DISCHARGE SUMMARY
[address] [place] [address]    Death Summary    Date: 4/25/2019  Name: Kyara Coker  MRN: [id number]  YOB: 1950     Patient's PCP: Teofilo Valles MD  Admit Date: 4/23/2019 to [address] [place] [address]  Date of Death: 4/24/19  Time: 6:29  Admitting Physician: Adair Cochran DO  Discharge Physician: Iker Oates MD  Consultation: none during General Inpatient Hospice stay    Invasive procedures: none during General Inpatient Hospice stay    Discharge Diagnoses:   1. Metastatic colon cancer  2. Acute  bowel obstruction related to tumor in colon  3. Hepatic encephalopathy  4. Severe thrombocytopenia  5. Cancer cachexia and debilitation  6. Acute on chronic renal failure          Patient Active Problem List   Diagnosis Code    Acute renal failure (ARF) ([facility] [address] N17.9    Severe malnutrition (HCC) E43    SVT (supraventricular tachycardia) (HCC) I47.1    Hepatic encephalopathy (HCC) K72.90    Fatigue R53.83    History of cancer Z85.9       Brief History, reason for admission: Leanna Schirmer a 78 y. o  female with Hx of metastatic colon cancer, who presents from SNF for altered mental status, she was found very lethargic, moaning but not interacting or talking, her friend was visiting her in SNF and didn't like how she looked so she asked SNF to transfer patient to Hospital, Patient was noted to be lethargic, awakens to tactile stimulation, her ammonia level was elevated. Her work up showed that she has colon obstruction cause by her colon mass. She was full code, has no children and is , she has elderly siblings in [facility] and 1 niece in [address] [place] [address], no advance directives. Patient was admitted to the [facility] and was in the intensive care unit. In addition to the hepatic encephalopathy she was found to have severe thrombocytopenia .      Over the next few days it became obvious that there were no disease modifying options.  An NG was placed for feeding but the patient tolerated it poorly. The patient continued with altered mental status and unable to participate in the conversation about what should be done  The code status was changed to DNR CC and the patient was able to be transferred to the hospice [facility] with the goal of SM, comfort and peaceful demise             Hospital Course: [facility] with [facility] above, for complete details, please see the acute care History and Physical, progress notes, consultant notes and discharge summary. The patient was treated with comfort medications, and symptoms were managed. Emotional and spiritual support was provided to the patient and family. The patient  as noted above.     Cause of death: Metastatic Colon Cancer    Significant Diagnostic Studies:  See computerized record in Epic      Electronically signed by Aristeo Byers DO on 2019 at 12:53 AM

## 2019-04-26 LAB
CULTURE: NORMAL
CULTURE: NORMAL
Lab: NORMAL
Lab: NORMAL
SPECIMEN: NORMAL
SPECIMEN: NORMAL